# Patient Record
Sex: FEMALE | Race: WHITE | NOT HISPANIC OR LATINO | Employment: FULL TIME | URBAN - METROPOLITAN AREA
[De-identification: names, ages, dates, MRNs, and addresses within clinical notes are randomized per-mention and may not be internally consistent; named-entity substitution may affect disease eponyms.]

---

## 2019-04-16 PROBLEM — E78.2 MIXED HYPERLIPIDEMIA: Status: ACTIVE | Noted: 2019-04-16

## 2019-04-16 PROBLEM — K21.00 GERD WITH ESOPHAGITIS: Status: ACTIVE | Noted: 2019-04-16

## 2019-04-16 PROBLEM — Z79.899 ENCOUNTER FOR LONG-TERM (CURRENT) USE OF MEDICATIONS: Status: ACTIVE | Noted: 2019-04-16

## 2019-04-16 PROBLEM — I10 MODERATE HYPERTENSION: Status: ACTIVE | Noted: 2019-04-16

## 2019-04-16 PROBLEM — F90.0 ATTENTION DEFICIT HYPERACTIVITY DISORDER (ADHD), PREDOMINANTLY INATTENTIVE TYPE: Status: ACTIVE | Noted: 2019-04-16

## 2019-07-02 ENCOUNTER — OFFICE VISIT (OUTPATIENT)
Dept: UROLOGY | Facility: CLINIC | Age: 64
End: 2019-07-02
Payer: COMMERCIAL

## 2019-07-02 ENCOUNTER — TELEPHONE (OUTPATIENT)
Dept: UROLOGY | Facility: CLINIC | Age: 64
End: 2019-07-02

## 2019-07-02 VITALS
HEIGHT: 60 IN | DIASTOLIC BLOOD PRESSURE: 79 MMHG | HEART RATE: 79 BPM | SYSTOLIC BLOOD PRESSURE: 132 MMHG | BODY MASS INDEX: 31.03 KG/M2 | WEIGHT: 158.06 LBS

## 2019-07-02 DIAGNOSIS — N23 RENAL COLIC ON RIGHT SIDE: Primary | ICD-10-CM

## 2019-07-02 DIAGNOSIS — N13.5 URETERAL OBSTRUCTION: ICD-10-CM

## 2019-07-02 PROCEDURE — 99999 PR PBB SHADOW E&M-EST. PATIENT-LVL III: CPT | Mod: PBBFAC,,, | Performed by: UROLOGY

## 2019-07-02 PROCEDURE — 99204 OFFICE O/P NEW MOD 45 MIN: CPT | Mod: S$GLB,,, | Performed by: UROLOGY

## 2019-07-02 PROCEDURE — 99999 PR PBB SHADOW E&M-EST. PATIENT-LVL III: ICD-10-PCS | Mod: PBBFAC,,, | Performed by: UROLOGY

## 2019-07-02 PROCEDURE — 99204 PR OFFICE/OUTPT VISIT, NEW, LEVL IV, 45-59 MIN: ICD-10-PCS | Mod: S$GLB,,, | Performed by: UROLOGY

## 2019-07-02 PROCEDURE — 99213 OFFICE O/P EST LOW 20 MIN: CPT | Mod: PBBFAC,PO | Performed by: UROLOGY

## 2019-07-02 RX ORDER — ESOMEPRAZOLE MAGNESIUM 20 MG/1
20 GRANULE, DELAYED RELEASE ORAL
COMMUNITY
End: 2019-08-06 | Stop reason: HOSPADM

## 2019-07-02 RX ORDER — ONDANSETRON 4 MG/1
TABLET, FILM COATED ORAL
Refills: 9 | COMMUNITY
Start: 2019-06-27 | End: 2021-10-04

## 2019-07-02 RX ORDER — OMEGA-3 FATTY ACIDS 1000 MG
2 CAPSULE ORAL DAILY
COMMUNITY

## 2019-07-02 NOTE — TELEPHONE ENCOUNTER
----- Message from Shaylee Pham sent at 7/2/2019  9:02 AM CDT -----  Contact: self   Type:  Same Day Appointment Request    Caller is requesting a same day appointment.  Caller declined first available appointment listed below.      Name of Caller: patient   When is the first available appointment?  7/17  Symptoms:  Kidney stones   Best Call Back Number:  [phone] (home)   Additional Information:  Pt was seen in the ER last night and need to be seen asap today

## 2019-07-02 NOTE — PROGRESS NOTES
"UROLOGY [place]  7 2 19    Cc R flank pain    Age 64. Started yesterday with pain on R flank and radiation to lower abdomen on that side. Some nausea was present, no emesis. Pt went to an urgent care in [place]. A CT scan was done and told she had a stone in the proximal ureter. Was given some pain medication and it improved.     Report describes "3 x 4 x 6 mm calculus of the right proximal ureter with mild proximal hydronephrosis, hydroureter. Some nonobstructive renal calcifications bilaterally slightly larger on the   left for example measuring 4 x 7 mm at the inferior pole.  There is mild hydronephrosis, hydroureter present on the right corresponding with the   calcification measuring approximately 3 x 4 x 6 mm in length.  The bladder is incompletely fluid-filled for evaluation which may exaggerate the wall thickness.  No radiopaque obstructive calculus or left hydronephrosis is appreciated otherwise.      Pt is experienced in this type of pain from having had kidney stones in the past.     Voiding well. Afebrile.     PMH    Surgical:  has a past surgical history that includes Tubal ligation; Lithotripsy; Breast surgery; Cosmetic surgery; Vaginal delivery; and urital stent.    Medical:  has a past medical history of ADD (attention deficit disorder), Headache(784.0), Hiatal hernia, Hypertension, Kidney stone, Lung nodule, and Pituitary tumor.    Familial: no fh of kidney stones    Social: lives in [place]    Meds:   Current Outpatient Medications on File Prior to Visit   Medication Sig Dispense Refill    atorvastatin (LIPITOR) 20 MG tablet TAKE 1 TABLET(20 MG) 90 tablet 3    bromocriptine (PARLODEL) 5 mg Cap Take 1 capsule (5 mg 90 capsule 3    butalbital-acetaminophen-caff -40 mg -40 mg Cap Cap 1 po as needed for h confusion. 30 capsule 1    cetirizine (ZYRTEC) 10 MG tablet Take       coenzyme Q10 (CO Q-10) 100 mg capsule Take 100 mg by mouth 2 (two) ana      " dextroamphetamine-amphetamine (ADDERALL) 20 mg tablet Tab 1 po in AM.  May take an extra 1/2 tabl 35 tablet 0    dextroamphetamine-amphetamine (ADDERALL) 20 mg tablet Take 1 tablet by mouth 2 (two) ana 60 tablet 0    dextroamphetamine-amphetamine (ADDERALL) 20 mg tablet Take 1 tablet by mouth 2 (two) time 60 tablet 0    fluticasone (FLONASE) 50 mcg/actuation nasal spray 1 spray (50 mcg total) by Each Nare r 16 g 0    ketorolac (TORADOL) 10 mg tablet Take 1 tablet (10 mg to. 16 tablet 0    losartan (COZAAR) 50 MG tablet TAKE 1 TABLET(50 MG)  90 tablet 3    omega-3 fatty acids 1,000 mg Cap Take 2 g by mouth.      ondansetron (ZOFRAN) 4 MG tablet TK 1 T PO BID PRN NV  9    promethazine (PHENERGAN) 25 MG tablet Take 1 tablet (25 mg tot 12 tablet 0    ranitidine (ZANTAC) 75 MG tablet Take 75 mg by mouth 2 (two) times          REVIEW OF SYSTEMS  GENERAL:  No headaches or dizzy spells.   HEENT: vision has glasses. Sinuses: No complaints.   CARDIOPULMONARY: No swelling of the legs; no chest pain. No shortness of breath, no wheezing.   GASTROINTESTINAL: has heartburn. Denies diarrhea; denies constipation, no blood or mucus in stools.   GENITOURINARY: Denies dysuria, bleeding or incontinence.   MUSCULOSKELETAL: No arthritic complaints such as pain or stiffness.   PSYCHIATRIC: No history of depression or anxiety.   ENDOCRINOLOGIC: No reports of sweating, cold or heat intolerance. No polyuria or polydipsia.   NEUROLOGICAL: No dizziness, syncope, paralysis  LYMPHATICS: No enlarged nodes. No history of splenectomy.    Pt alert, oriented, no distress  HEENT: wnl.  Neck: supple, no JVD, no lymphadenopathy  Chest: CV NSR  Lungs: normal chest expansion, no labored breathing  Abdomen flat, nontender, no organomegaly, no masses.  Extremities: no edema, peripheral pulses nl  Neuro: preserved    IMP    7 x 4 mm stone blocking the proximal R ureter. My impression from studying the xray is that the imaging really consists of two  smaller stones together, and if so, we can expect that she would likely pass them on her own. Pt knows to communicate with us if she has persistent pain, as we may need to do ureteroscopic extraction of the stones. If a kub shows the proximal ureteral stones to be distinguishable, we may treat them with eswl.

## 2019-07-03 ENCOUNTER — TELEPHONE (OUTPATIENT)
Dept: UROLOGY | Facility: CLINIC | Age: 64
End: 2019-07-03

## 2019-07-03 ENCOUNTER — HOSPITAL ENCOUNTER (OUTPATIENT)
Dept: RADIOLOGY | Facility: HOSPITAL | Age: 64
Discharge: HOME | End: 2019-07-03
Attending: UROLOGY
Payer: COMMERCIAL

## 2019-07-03 DIAGNOSIS — N20.0 KIDNEY STONE: ICD-10-CM

## 2019-07-03 DIAGNOSIS — N20.0 KIDNEY STONE: Primary | ICD-10-CM

## 2019-07-03 PROCEDURE — 74018 RADEX ABDOMEN 1 VIEW: CPT | Mod: TC,PN

## 2019-07-03 PROCEDURE — 74018 XR ABDOMEN AP 1 VIEW: ICD-10-PCS | Mod: 26,,, | Performed by: RADIOLOGY

## 2019-07-03 PROCEDURE — 74018 RADEX ABDOMEN 1 VIEW: CPT | Mod: 26,,, | Performed by: RADIOLOGY

## 2019-07-03 RX ORDER — HYDROCODONE BITARTRATE AND ACETAMINOPHEN 5; 325 MG/1; MG/1
1 TABLET ORAL EVERY 6 HOURS PRN
Qty: 30 TABLET | Refills: 0 | Status: SHIPPED | OUTPATIENT
Start: 2019-07-03 | End: 2019-07-04 | Stop reason: SDUPTHER

## 2019-07-03 NOTE — TELEPHONE ENCOUNTER
----- Message from Helga Tubbs sent at 7/3/2019 11:27 AM CDT -----  Contact: [phone]  Patient is requesting a call back from the nurse stated the pain medication she's using not working.    Please call the patient upon request at phone number [phone].

## 2019-07-03 NOTE — TELEPHONE ENCOUNTER
Pt states she is having severe break through pain. Scheduled for KUB this afternoon. Toradol is not covering the pain. Can we send something else for pain?

## 2019-07-03 NOTE — TELEPHONE ENCOUNTER
----- Message from Spring Guerrero sent at 7/3/2019 12:03 PM CDT -----  Type:  Patient Returning Call    Who Called:  Patient  Who Left Message for Patient:  Jasson  Does the patient know what this is regarding?:  ELBA  Best Call Back Number:  [phone]  Additional Information:

## 2019-07-05 ENCOUNTER — TELEPHONE (OUTPATIENT)
Dept: UROLOGY | Facility: CLINIC | Age: 64
End: 2019-07-05

## 2019-07-05 DIAGNOSIS — N20.1 URETERAL STONE: Primary | ICD-10-CM

## 2019-07-05 NOTE — TELEPHONE ENCOUNTER
----- Message from Janell Kinney sent at 7/5/2019 11:00 AM CDT -----  Contact: Patient  Type: Needs Medical Advice    Who Called: Patient  Best Call Back Number: [phone] (home)   Additional Information: Patient said she is still waiting on a call back from the nurse in regards to a test that was suppose to be set up please call and she will explain

## 2019-07-05 NOTE — TELEPHONE ENCOUNTER
Spoke to pt and scheduling surgery for Monday at [facility] for her ureteral stone

## 2019-07-08 ENCOUNTER — HOSPITAL ENCOUNTER (OUTPATIENT)
Dept: RADIOLOGY | Facility: HOSPITAL | Age: 64
Discharge: HOME | End: 2019-07-08
Attending: UROLOGY
Payer: COMMERCIAL

## 2019-07-08 ENCOUNTER — TELEPHONE (OUTPATIENT)
Dept: CARDIOLOGY | Facility: CLINIC | Age: 64
End: 2019-07-08

## 2019-07-08 DIAGNOSIS — N20.0 KIDNEY STONE: Primary | ICD-10-CM

## 2019-07-08 DIAGNOSIS — N20.0 KIDNEY STONE: ICD-10-CM

## 2019-07-08 PROCEDURE — 74018 RADEX ABDOMEN 1 VIEW: CPT | Mod: TC,PN

## 2019-07-08 PROCEDURE — 74018 XR ABDOMEN AP 1 VIEW: ICD-10-PCS | Mod: 26,,, | Performed by: RADIOLOGY

## 2019-07-08 PROCEDURE — 74018 RADEX ABDOMEN 1 VIEW: CPT | Mod: 26,,, | Performed by: RADIOLOGY

## 2019-07-08 NOTE — TELEPHONE ENCOUNTER
Pt states she thinks she passed the stone over the weekend. Cancelled procedure and scheduled KUB for today. Advised ot we would call with results. Pt wants to know if she can have another prescription for Toradol sent to her pharmacy. Please advise.

## 2019-07-08 NOTE — TELEPHONE ENCOUNTER
----- Message from RT Kenyon sent at 7/8/2019  8:47 AM CDT -----  Contact: pt    pt , requesting a call back very soon, she is seeking medical advise concerning the stone, thanks.

## 2019-07-10 ENCOUNTER — TELEPHONE (OUTPATIENT)
Dept: UROLOGY | Facility: CLINIC | Age: 64
End: 2019-07-10

## 2019-07-10 NOTE — TELEPHONE ENCOUNTER
----- Message from Jabari Young sent at 7/10/2019  8:42 AM CDT -----  Contact: pt  Type: Needs Medical Advice    Who Called:  pt  Best Call Back Number: [phone]  Additional Information: checking on status of xray done on Monday and refill was supposed to be sent for ketorolac (TORADOL) 10 mg tablet if patient needed more. Please call to advise

## 2019-07-15 ENCOUNTER — TELEPHONE (OUTPATIENT)
Dept: UROLOGY | Facility: CLINIC | Age: 64
End: 2019-07-15

## 2019-07-15 RX ORDER — KETOROLAC TROMETHAMINE 10 MG/1
10 TABLET, FILM COATED ORAL EVERY 6 HOURS
COMMUNITY
End: 2019-08-06 | Stop reason: HOSPADM

## 2019-07-15 NOTE — TELEPHONE ENCOUNTER
----- Message from Ksenia Preciado sent at 7/15/2019  2:06 PM CDT -----  Contact: Patient  Type:  Patient Returning Call    Who Called:  Patient  Who Left Message for Patient:  Dr. Lemos  Does the patient know what this is regarding?:  Yes, test results  Best Call Back Number:  [phone] (home)    Additional Information:  na

## 2019-07-15 NOTE — TELEPHONE ENCOUNTER
Spoke to pt and informed her of test results. Pt is going to go see Dr. Alvarez. I will fax her x-rays and ct per her request. Pt did want a prescription for toradol, called in per dr mitchell.

## 2019-07-15 NOTE — TELEPHONE ENCOUNTER
----- Message from Johnchan Billy sent at 7/15/2019  1:46 PM CDT -----  Contact: Patient  Type: Needs Medical Advice    Who Called:  Patient  Best Call Back Number: [phone]  Additional Information:Requesting an update on x-ray results and medication Toradol. She has attempted to contact office multiple times without a response. Patient is requesting all medical records from [facility] to be faxed to Dr Robb Dooley Fax: [phone] Attn: Isi. Please advise patient of status and when records are sent

## 2019-07-15 NOTE — TELEPHONE ENCOUNTER
I called pt to discuss xrays, I have reviewed them all. That is, the CT scan followed by 3 kub's.     I dont see a definite stone in the R ureter, but the radiologist feels that there may be something there in the plain xray. We know there was a stone when the CT scan was done. Not sure if it is there or not at this time. We see multiple oblong tablets or capsules in the intestinal tract, which can be confused with stones in the kub (but not in a CT scan).     There was no answer and I left a message in the answering machine. If she needs pain medication please supply. If doubt we can do csto, and retrograde and ureteroscopy (works) R side or wait.

## 2019-08-06 ENCOUNTER — OFFICE VISIT (OUTPATIENT)
Dept: CARDIOLOGY | Facility: CLINIC | Age: 64
End: 2019-08-06
Payer: COMMERCIAL

## 2019-08-06 VITALS
WEIGHT: 154.13 LBS | DIASTOLIC BLOOD PRESSURE: 82 MMHG | SYSTOLIC BLOOD PRESSURE: 116 MMHG | HEART RATE: 70 BPM | BODY MASS INDEX: 30.26 KG/M2 | HEIGHT: 60 IN

## 2019-08-06 DIAGNOSIS — E78.2 MIXED HYPERLIPIDEMIA: ICD-10-CM

## 2019-08-06 DIAGNOSIS — I10 MODERATE HYPERTENSION: Primary | ICD-10-CM

## 2019-08-06 PROCEDURE — 99204 OFFICE O/P NEW MOD 45 MIN: CPT | Mod: S$GLB,,, | Performed by: INTERNAL MEDICINE

## 2019-08-06 PROCEDURE — 99999 PR PBB SHADOW E&M-EST. PATIENT-LVL II: ICD-10-PCS | Mod: PBBFAC,,, | Performed by: INTERNAL MEDICINE

## 2019-08-06 PROCEDURE — 99999 PR PBB SHADOW E&M-EST. PATIENT-LVL II: CPT | Mod: PBBFAC,,, | Performed by: INTERNAL MEDICINE

## 2019-08-06 PROCEDURE — 99204 PR OFFICE/OUTPT VISIT, NEW, LEVL IV, 45-59 MIN: ICD-10-PCS | Mod: S$GLB,,, | Performed by: INTERNAL MEDICINE

## 2019-08-06 NOTE — PROGRESS NOTES
Subjective:    Patient ID:  Anna Alas is a 64 y.o. female who presents for evaluation of cardiac issues    HPI  She comes with no atypical chest pain, no SOB  Strong family hx    Review of Systems   Constitution: Negative for decreased appetite, malaise/fatigue, weight gain and weight loss.   Cardiovascular: Negative for chest pain, dyspnea on exertion, leg swelling, palpitations and syncope.   Respiratory: Negative for cough and shortness of breath.    Gastrointestinal: Negative.    Neurological: Negative for weakness.   All other systems reviewed and are negative.       Objective:      Physical Exam   Constitutional: She is oriented to person, place, and time. She appears well-developed and well-nourished.   HENT:   Head: Normocephalic.   Eyes: Pupils are equal, round, and reactive to light.   Neck: Normal range of motion. Neck supple. No JVD present. Carotid bruit is not present. No thyromegaly present.   Cardiovascular: Normal rate, regular rhythm, normal heart sounds, intact distal pulses and normal pulses. PMI is not displaced. Exam reveals no gallop.   No murmur heard.  Pulmonary/Chest: Effort normal and breath sounds normal.   Abdominal: Soft. Normal appearance. She exhibits no mass. There is no hepatosplenomegaly. There is no tenderness.   Musculoskeletal: Normal range of motion. She exhibits no edema.   Neurological: She is alert and oriented to person, place, and time. She has normal strength and normal reflexes. No sensory deficit.   Skin: Skin is warm and intact.   Psychiatric: She has a normal mood and affect.   Nursing note and vitals reviewed.        Assessment:       1. Moderate hypertension    2. Mixed hyperlipidemia         Plan:     Stress echo  Call with results

## 2019-08-14 ENCOUNTER — TELEPHONE (OUTPATIENT)
Dept: CARDIOLOGY | Facility: CLINIC | Age: 64
End: 2019-08-14

## 2019-08-19 ENCOUNTER — TELEPHONE (OUTPATIENT)
Dept: CARDIOLOGY | Facility: CLINIC | Age: 64
End: 2019-08-19

## 2019-08-19 NOTE — TELEPHONE ENCOUNTER
----- Message from Patricia Murray sent at 8/19/2019  9:25 AM CDT -----  Contact: Anna pt  Type: Needs Medical Advice    Who Called:  Anna Fields Call Back Number: [phone]  Additional Information: Pls call pt regarding her stress test

## 2019-09-11 ENCOUNTER — TELEPHONE (OUTPATIENT)
Dept: CARDIOLOGY | Facility: CLINIC | Age: 64
End: 2019-09-11

## 2019-09-11 DIAGNOSIS — I10 MODERATE HYPERTENSION: Primary | ICD-10-CM

## 2019-09-11 DIAGNOSIS — E78.2 MIXED HYPERLIPIDEMIA: ICD-10-CM

## 2019-09-11 NOTE — TELEPHONE ENCOUNTER
Spoke with pt informed her that her stress test was denied because she was out of network. She stated she would like us to submit the test again. Orders have been placed test has been scheduled as requested. Told pt that she would be responsible if her insurance company does not pay. She verbalized understanding.

## 2019-09-11 NOTE — TELEPHONE ENCOUNTER
----- Message from Pablo Loyd sent at 9/11/2019  8:09 AM CDT -----  Contact: Otis FAUST with  [facility]  Type: Needs Medical Advice    Who Called:  Otis    Best Call Back Number: [phone]   Additional Information: States the ppt needs to discuss the ECHO that was on 8/19/19. Otis request the office call the pt.

## 2019-09-11 NOTE — TELEPHONE ENCOUNTER
----- Message from Tiffany Funes sent at 9/11/2019  8:17 AM CDT -----  Contact: self  Patient requesting to speak to nurse regarding she has information to give to office about her insurance and scheduling stress echo test per patient         Please call to advice [phone] (home)

## 2019-09-13 ENCOUNTER — TELEPHONE (OUTPATIENT)
Dept: CARDIOLOGY | Facility: CLINIC | Age: 64
End: 2019-09-13

## 2019-09-13 NOTE — TELEPHONE ENCOUNTER
----- Message from Navi Vela sent at 9/13/2019 11:35 AM CDT -----  Type: Needs Medical Advice    Who Called:  Patient    Best Call Back Number: [phone]  Additional Information: Patient states that she would like a callback regarding whether her Stress Echo test on 10/11 has been approved yet.

## 2019-09-17 ENCOUNTER — TELEPHONE (OUTPATIENT)
Dept: CARDIOLOGY | Facility: CLINIC | Age: 64
End: 2019-09-17

## 2019-09-17 NOTE — TELEPHONE ENCOUNTER
Pt is requesting that we complete a peer to peer for her stress test scheduled for 10/11. Insurance denied due to pt being out of network. The number is provided below:   [phone]

## 2019-09-17 NOTE — TELEPHONE ENCOUNTER
----- Message from Shaylee Pham sent at 9/17/2019 12:39 PM CDT -----  Contact: patient   Patient need to speak with a nurse regarding a stress echo. Please call back at [phone] (home)

## 2019-09-27 NOTE — TELEPHONE ENCOUNTER
I have called twice and left a message to schedule a Jnkx-nk-Foop. Have still not received a call back.

## 2019-10-01 ENCOUNTER — TELEPHONE (OUTPATIENT)
Dept: CARDIOLOGY | Facility: CLINIC | Age: 64
End: 2019-10-01

## 2019-10-01 NOTE — TELEPHONE ENCOUNTER
----- Message from Yady Pimentel sent at 10/1/2019  9:28 AM CDT -----  Type: Needs Medical Advice - message to Kenia    Who Called:  patient  Symptoms (please be specific):  na  How long has patient had these symptoms:  [facility]  Pharmacy name and phone #:  [facility]  Best Call Back Number: [phone]  Additional Information: patient is calling about [facility] [place] [facility] to do a PEER to PEER with patient's physician

## 2019-10-04 ENCOUNTER — TELEPHONE (OUTPATIENT)
Dept: CARDIOLOGY | Facility: CLINIC | Age: 64
End: 2019-10-04

## 2019-10-04 NOTE — TELEPHONE ENCOUNTER
Informed patient that we have not heard back from insurance company. Patient wants to know if there are any other test she can do?

## 2019-10-04 NOTE — TELEPHONE ENCOUNTER
----- Message from Evelyn Sethi sent at 10/4/2019  2:20 PM CDT -----  Contact: patient  Type: Needs Medical Advice    Who Called:  patient  Best Call Back Number: [phone]  Additional Information: requesting a call back regarding approval status for her stress test and if peer to peer was done. please call back to advise, thank you!

## 2019-10-10 ENCOUNTER — TELEPHONE (OUTPATIENT)
Dept: CARDIOLOGY | Facility: CLINIC | Age: 64
End: 2019-10-10

## 2019-10-10 DIAGNOSIS — E78.2 MIXED HYPERLIPIDEMIA: ICD-10-CM

## 2019-10-10 DIAGNOSIS — I10 MODERATE HYPERTENSION: Primary | ICD-10-CM

## 2019-10-10 NOTE — TELEPHONE ENCOUNTER
----- Message from Lina Bobby sent at 10/10/2019  1:19 PM CDT -----  Contact: self  Patient is requesting a call back from Kenia, she thought see was to have a stress echo tomorrow but it is cancelled.  Call back at [phone] (home).  Thanks

## 2019-10-10 NOTE — TELEPHONE ENCOUNTER
----- Message from Lina Bobby sent at 10/10/2019  1:19 PM CDT -----  Contact: self  Patient is requesting a call back from Kenia, she thought see was to have a stress echo tomorrow but it is cancelled.  Call back at [phone] (home).  Thanks

## 2019-10-10 NOTE — TELEPHONE ENCOUNTER
Spoke to patient and scheduled nuclear stress test for 10/15 at 1:15pm. Patient verbalized understanding and is aware of time, date, and instructions.

## 2019-10-21 ENCOUNTER — TELEPHONE (OUTPATIENT)
Dept: CARDIOLOGY | Facility: CLINIC | Age: 64
End: 2019-10-21

## 2019-10-21 PROBLEM — B02.9 HERPES ZOSTER WITHOUT COMPLICATION: Status: ACTIVE | Noted: 2019-10-21

## 2019-10-21 NOTE — TELEPHONE ENCOUNTER
Stress technician already spoke with patient ----- Message from Evelyn Sethi sent at 10/21/2019  2:27 PM CDT -----  Contact: patient  Type: Needs Medical Advice    Who Called:  patient    Best Call Back Number: [phone]    Additional Information: Pt has a nuclear stress test tomorrow and is wondering if her having shingles is an issue and if something can interfere? Please call to advise and answer questions, thank you!

## 2019-10-30 ENCOUNTER — HOSPITAL ENCOUNTER (OUTPATIENT)
Dept: RADIOLOGY | Facility: HOSPITAL | Age: 64
Discharge: HOME | End: 2019-10-30
Attending: INTERNAL MEDICINE
Payer: COMMERCIAL

## 2019-10-30 ENCOUNTER — CLINICAL SUPPORT (OUTPATIENT)
Dept: CARDIOLOGY | Facility: CLINIC | Age: 64
End: 2019-10-30
Attending: INTERNAL MEDICINE
Payer: COMMERCIAL

## 2019-10-30 VITALS — HEIGHT: 60 IN | BODY MASS INDEX: 30.82 KG/M2 | WEIGHT: 157 LBS

## 2019-10-30 DIAGNOSIS — I10 MODERATE HYPERTENSION: ICD-10-CM

## 2019-10-30 DIAGNOSIS — E78.2 MIXED HYPERLIPIDEMIA: ICD-10-CM

## 2019-10-30 LAB
CV STRESS BASE HR: 77 BPM
DIASTOLIC BLOOD PRESSURE: 75 MMHG
NUC REST EJECTION FRACTION: 70
NUC STRESS EJECTION FRACTION: 76 %
OHS CV CPX 1 MINUTE RECOVERY HEART RATE: 106 BPM
OHS CV CPX 85 PERCENT MAX PREDICTED HEART RATE MALE: 129
OHS CV CPX ESTIMATED METS: 12
OHS CV CPX MAX PREDICTED HEART RATE: 151
OHS CV CPX PATIENT IS FEMALE: 1
OHS CV CPX PATIENT IS MALE: 0
OHS CV CPX PEAK DIASTOLIC BLOOD PRESSURE: 82 MMHG
OHS CV CPX PEAK HEAR RATE: 136 BPM
OHS CV CPX PEAK RATE PRESSURE PRODUCT: NORMAL
OHS CV CPX PEAK SYSTOLIC BLOOD PRESSURE: 163 MMHG
OHS CV CPX PERCENT MAX PREDICTED HEART RATE ACHIEVED: 90
OHS CV CPX RATE PRESSURE PRODUCT PRESENTING: NORMAL
STRESS ECHO POST EXERCISE DUR MIN: 6 MINUTES
STRESS ECHO POST EXERCISE DUR SEC: 51 SECONDS
STRESS ECHO TARGET HR: 134 BPM
SYSTOLIC BLOOD PRESSURE: 131 MMHG

## 2019-10-30 PROCEDURE — A9502 TC99M TETROFOSMIN: HCPCS | Mod: PO

## 2019-10-30 PROCEDURE — 99999 PR PBB SHADOW E&M-EST. PATIENT-LVL I: CPT | Mod: PBBFAC,,,

## 2019-10-30 PROCEDURE — 78452 STRESS TEST WITH MYOCARDIAL PERFUSION (CUPID ONLY): ICD-10-PCS | Mod: 26,,, | Performed by: INTERNAL MEDICINE

## 2019-10-30 PROCEDURE — 93015 CV STRESS TEST SUPVJ I&R: CPT | Mod: ,,, | Performed by: INTERNAL MEDICINE

## 2019-10-30 PROCEDURE — 78452 HT MUSCLE IMAGE SPECT MULT: CPT | Mod: 26,,, | Performed by: INTERNAL MEDICINE

## 2019-10-30 PROCEDURE — 99999 PR PBB SHADOW E&M-EST. PATIENT-LVL I: ICD-10-PCS | Mod: PBBFAC,,,

## 2019-10-30 PROCEDURE — 93015 STRESS TEST WITH MYOCARDIAL PERFUSION (CUPID ONLY): ICD-10-PCS | Mod: ,,, | Performed by: INTERNAL MEDICINE

## 2019-10-31 NOTE — PROGRESS NOTES
Advised patient of test result. Patient need to know if she is to start aspirin, and the decision of her Lipitor do she keep it the same dosage or increase the dosage?

## 2020-07-30 ENCOUNTER — OFFICE VISIT (OUTPATIENT)
Dept: CARDIOLOGY | Facility: CLINIC | Age: 65
End: 2020-07-30
Payer: COMMERCIAL

## 2020-07-30 VITALS
BODY MASS INDEX: 31.38 KG/M2 | HEART RATE: 84 BPM | DIASTOLIC BLOOD PRESSURE: 91 MMHG | WEIGHT: 159.81 LBS | HEIGHT: 60 IN | SYSTOLIC BLOOD PRESSURE: 154 MMHG

## 2020-07-30 DIAGNOSIS — I10 MODERATE HYPERTENSION: Primary | ICD-10-CM

## 2020-07-30 DIAGNOSIS — E78.2 MIXED HYPERLIPIDEMIA: ICD-10-CM

## 2020-07-30 PROCEDURE — 99999 PR PBB SHADOW E&M-EST. PATIENT-LVL III: ICD-10-PCS | Mod: PBBFAC,,, | Performed by: INTERNAL MEDICINE

## 2020-07-30 PROCEDURE — 99214 OFFICE O/P EST MOD 30 MIN: CPT | Mod: S$GLB,,, | Performed by: INTERNAL MEDICINE

## 2020-07-30 PROCEDURE — 99214 PR OFFICE/OUTPT VISIT, EST, LEVL IV, 30-39 MIN: ICD-10-PCS | Mod: S$GLB,,, | Performed by: INTERNAL MEDICINE

## 2020-07-30 PROCEDURE — 99999 PR PBB SHADOW E&M-EST. PATIENT-LVL III: CPT | Mod: PBBFAC,,, | Performed by: INTERNAL MEDICINE

## 2020-07-30 RX ORDER — PRAVASTATIN SODIUM 40 MG/1
40 TABLET ORAL NIGHTLY
Qty: 90 TABLET | Refills: 3 | Status: SHIPPED | OUTPATIENT
Start: 2020-07-30 | End: 2021-11-30

## 2020-07-30 NOTE — PROGRESS NOTES
Subjective:    Patient ID:  Anna Alas is a 65 y.o. female who presents for follow-up of hypertension    HPI  She comes with no major complaints, no chest pain, no shortness of breath  Muscle pains are main complaint    Review of Systems   Constitution: Negative for decreased appetite, malaise/fatigue, weight gain and weight loss.   Cardiovascular: Negative for chest pain, dyspnea on exertion, leg swelling, palpitations and syncope.   Respiratory: Negative for cough and shortness of breath.    Gastrointestinal: Negative.    Neurological: Negative for weakness.   All other systems reviewed and are negative.       Objective:      Physical Exam   Constitutional: She is oriented to person, place, and time. She appears well-developed and well-nourished.   HENT:   Head: Normocephalic.   Eyes: Pupils are equal, round, and reactive to light.   Neck: Normal range of motion. Neck supple. No JVD present. Carotid bruit is not present. No thyromegaly present.   Cardiovascular: Normal rate, regular rhythm, normal heart sounds, intact distal pulses and normal pulses. PMI is not displaced. Exam reveals no gallop.   No murmur heard.  Pulmonary/Chest: Effort normal and breath sounds normal.   Abdominal: Soft. Normal appearance. She exhibits no mass. There is no hepatosplenomegaly. There is no abdominal tenderness.   Musculoskeletal: Normal range of motion.         General: No edema.   Neurological: She is alert and oriented to person, place, and time. She has normal strength and normal reflexes. No sensory deficit.   Skin: Skin is warm and intact.   Psychiatric: She has a normal mood and affect.   Nursing note and vitals reviewed.        Assessment:       1. Moderate hypertension    2. Mixed hyperlipidemia         Plan:   Labs next week; call with results  Stop lipitor  Try pravastatin 40 qhs  Continue all other cardiac medications  Regular exercise program  Weight loss  9 m f/u

## 2020-09-17 ENCOUNTER — OFFICE VISIT (OUTPATIENT)
Dept: GASTROENTEROLOGY | Facility: CLINIC | Age: 65
End: 2020-09-17
Payer: COMMERCIAL

## 2020-09-17 VITALS
WEIGHT: 160.06 LBS | BODY MASS INDEX: 31.42 KG/M2 | DIASTOLIC BLOOD PRESSURE: 88 MMHG | HEART RATE: 78 BPM | HEIGHT: 60 IN | SYSTOLIC BLOOD PRESSURE: 143 MMHG | RESPIRATION RATE: 18 BRPM

## 2020-09-17 DIAGNOSIS — K21.9 GASTROESOPHAGEAL REFLUX DISEASE, ESOPHAGITIS PRESENCE NOT SPECIFIED: Primary | ICD-10-CM

## 2020-09-17 DIAGNOSIS — K44.9 HIATAL HERNIA: ICD-10-CM

## 2020-09-17 DIAGNOSIS — R07.89 ATYPICAL CHEST PAIN: ICD-10-CM

## 2020-09-17 DIAGNOSIS — Z01.812 PRE-PROCEDURE LAB EXAM: ICD-10-CM

## 2020-09-17 PROCEDURE — 99999 PR PBB SHADOW E&M-EST. PATIENT-LVL IV: CPT | Mod: PBBFAC,,, | Performed by: INTERNAL MEDICINE

## 2020-09-17 PROCEDURE — 99203 OFFICE O/P NEW LOW 30 MIN: CPT | Mod: S$GLB,,, | Performed by: INTERNAL MEDICINE

## 2020-09-17 PROCEDURE — 99203 PR OFFICE/OUTPT VISIT, NEW, LEVL III, 30-44 MIN: ICD-10-PCS | Mod: S$GLB,,, | Performed by: INTERNAL MEDICINE

## 2020-09-17 PROCEDURE — 99999 PR PBB SHADOW E&M-EST. PATIENT-LVL IV: ICD-10-PCS | Mod: PBBFAC,,, | Performed by: INTERNAL MEDICINE

## 2020-09-17 NOTE — PROGRESS NOTES
Subjective:       Patient ID: Anna Alas is a 65 y.o. female.    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  She was seen in the ER a few weeks ago (see below).   She's been having more burning recently (about 2-3 x/week).  And rarely has trouble swallowing.  She's been told she might have a hiatal hernia (told that in the last 5 years).  Doesn't recall how it was dx'd, as she's never had an EGD (see CT report below).  She had previously taken Protonix OTC, but now on pantoprazole 40 mg since ER, as well as sucralfate since ER.  And she's taken Pepcid PRN.    No signig GI disease in the family.  Both parents with lung cancer (both were smokers).  And heart disease,too.  Her twin sister and a brother have CAD, and have stents.      Seen in ER 8/22/2020:  Patient presents with   Nausea   Heartburn   This is a 65-year-old female who has a history of hypertension, high triglycerides, strong cardiac family history including her twin sister had a heart attack, who is had a stress test in October of last year, nuclear stress test which was normal.  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  She has very mild nausea, pain does not radiate, no associated shortness of breath.  She has had no fever chills cough or cold symptoms, no leg pain or swelling.  ED Course as of Aug 22 1131  Sat Aug 22, 2020  1022 Patient has  "been asymptomatic in the emergency department, this does appear to be GI in nature, she had a negative stress test 1 year ago, it is burning, associated nausea, she is on an unknown acid blocker, will add Carafate and have her follow-up with GI for further workup.       CT Scan 7/2/2020:   "Small hiatal hernia with slight gastroesophageal fold thickening is appreciated. "    Review of Systems   Constitutional: Negative for activity change, appetite change, fatigue, fever and unexpected weight change.   HENT: Positive for trouble swallowing. Negative for dental problem, drooling, mouth sores, sore throat and voice change.    Eyes: Negative.    Respiratory: Negative for cough, choking, shortness of breath, wheezing and stridor.    Cardiovascular: Positive for chest pain (Burning chest pain.).   Gastrointestinal: Positive for abdominal pain (Occ epig pain.). Negative for abdominal distention, anal bleeding, blood in stool, constipation, diarrhea, nausea, rectal pain and vomiting.   Genitourinary: Negative.    Musculoskeletal: Negative for arthralgias, joint swelling, myalgias and neck stiffness.   Integumentary:  Negative for color change and rash.   Neurological: Negative for weakness.   Hematological: Negative for adenopathy. Does not bruise/bleed easily.   Psychiatric/Behavioral: Negative for agitation, behavioral problems, confusion, decreased concentration and dysphoric mood.         Objective:      Physical Exam  Vitals signs and nursing note reviewed.   Constitutional:       General: She is not in acute distress.     Appearance: Normal appearance. She is well-developed.   HENT:      Head: Normocephalic and atraumatic.      Nose: Nose normal.      Mouth/Throat:      Mouth: Mucous membranes are moist.      Pharynx: No oropharyngeal exudate.   Eyes:      General: No scleral icterus.     Conjunctiva/sclera: Conjunctivae normal.   Neck:      Musculoskeletal: Neck supple.      Thyroid: No thyromegaly.      Trachea: No " tracheal deviation.   Cardiovascular:      Rate and Rhythm: Normal rate and regular rhythm.      Heart sounds: Normal heart sounds. No murmur. No gallop.    Pulmonary:      Effort: Pulmonary effort is normal.      Breath sounds: Normal breath sounds. No wheezing or rales.   Abdominal:      General: Bowel sounds are normal. There is no distension.      Palpations: Abdomen is soft. There is no mass.      Tenderness: There is abdominal tenderness (Slight epig tenderness.). There is rebound. There is no guarding.   Musculoskeletal: Normal range of motion.   Lymphadenopathy:      Cervical: No cervical adenopathy.   Skin:     General: Skin is warm and dry.      Findings: No erythema or rash.   Neurological:      Mental Status: She is alert and oriented to person, place, and time.   Psychiatric:         Mood and Affect: Mood normal.         Behavior: Behavior normal.         Thought Content: Thought content normal.         Assessment:         Gastroesophageal reflux disease, esophagitis presence not specified    Atypical chest pain    Hiatal hernia       Plan:        1. Cont meds for now.   2. Sched for EGD.

## 2020-09-19 ENCOUNTER — LAB VISIT (OUTPATIENT)
Dept: FAMILY MEDICINE | Facility: CLINIC | Age: 65
End: 2020-09-19
Payer: COMMERCIAL

## 2020-09-19 DIAGNOSIS — Z01.812 PRE-PROCEDURE LAB EXAM: ICD-10-CM

## 2020-09-19 PROCEDURE — U0003 INFECTIOUS AGENT DETECTION BY NUCLEIC ACID (DNA OR RNA); SEVERE ACUTE RESPIRATORY SYNDROME CORONAVIRUS 2 (SARS-COV-2) (CORONAVIRUS DISEASE [COVID-19]), AMPLIFIED PROBE TECHNIQUE, MAKING USE OF HIGH THROUGHPUT TECHNOLOGIES AS DESCRIBED BY CMS-2020-01-R: HCPCS

## 2020-09-20 LAB — SARS-COV-2 RNA RESP QL NAA+PROBE: NOT DETECTED

## 2020-09-22 ENCOUNTER — HOSPITAL ENCOUNTER (OUTPATIENT)
Facility: HOSPITAL | Age: 65
Discharge: HOME | End: 2020-09-22
Attending: INTERNAL MEDICINE | Admitting: INTERNAL MEDICINE
Payer: COMMERCIAL

## 2020-09-22 ENCOUNTER — ANESTHESIA (OUTPATIENT)
Dept: ENDOSCOPY | Facility: HOSPITAL | Age: 65
End: 2020-09-22
Payer: COMMERCIAL

## 2020-09-22 ENCOUNTER — ANESTHESIA EVENT (OUTPATIENT)
Dept: ENDOSCOPY | Facility: HOSPITAL | Age: 65
End: 2020-09-22
Payer: COMMERCIAL

## 2020-09-22 DIAGNOSIS — K21.9 GERD (GASTROESOPHAGEAL REFLUX DISEASE): ICD-10-CM

## 2020-09-22 DIAGNOSIS — K21.00 GERD WITH ESOPHAGITIS: Primary | ICD-10-CM

## 2020-09-22 LAB — H PYLORI INDEX VALUE: NEGATIVE

## 2020-09-22 PROCEDURE — D9220A PRA ANESTHESIA: Mod: CRNA,,, | Performed by: NURSE ANESTHETIST, CERTIFIED REGISTERED

## 2020-09-22 PROCEDURE — 43248 PR EGD, FLEX, W/DILATION OVER GUIDEWIRE: ICD-10-PCS | Mod: ,,, | Performed by: INTERNAL MEDICINE

## 2020-09-22 PROCEDURE — 25000003 PHARM REV CODE 250: Mod: PO | Performed by: NURSE ANESTHETIST, CERTIFIED REGISTERED

## 2020-09-22 PROCEDURE — 88342 CHG IMMUNOCYTOCHEMISTRY: ICD-10-PCS | Mod: 26,,, | Performed by: PATHOLOGY

## 2020-09-22 PROCEDURE — 43239 EGD BIOPSY SINGLE/MULTIPLE: CPT | Mod: 59,,, | Performed by: INTERNAL MEDICINE

## 2020-09-22 PROCEDURE — 43239 EGD BIOPSY SINGLE/MULTIPLE: CPT | Mod: PO | Performed by: INTERNAL MEDICINE

## 2020-09-22 PROCEDURE — 63600175 PHARM REV CODE 636 W HCPCS: Mod: PO | Performed by: INTERNAL MEDICINE

## 2020-09-22 PROCEDURE — 27201012 HC FORCEPS, HOT/COLD, DISP: Mod: PO | Performed by: INTERNAL MEDICINE

## 2020-09-22 PROCEDURE — 87449 NOS EACH ORGANISM AG IA: CPT | Mod: PO | Performed by: INTERNAL MEDICINE

## 2020-09-22 PROCEDURE — 88342 IMHCHEM/IMCYTCHM 1ST ANTB: CPT | Performed by: PATHOLOGY

## 2020-09-22 PROCEDURE — D9220A PRA ANESTHESIA: Mod: ANES,,, | Performed by: ANESTHESIOLOGY

## 2020-09-22 PROCEDURE — 37000009 HC ANESTHESIA EA ADD 15 MINS: Mod: PO | Performed by: INTERNAL MEDICINE

## 2020-09-22 PROCEDURE — 37000008 HC ANESTHESIA 1ST 15 MINUTES: Mod: PO | Performed by: INTERNAL MEDICINE

## 2020-09-22 PROCEDURE — 88305 TISSUE EXAM BY PATHOLOGIST: ICD-10-PCS | Mod: 26,,, | Performed by: PATHOLOGY

## 2020-09-22 PROCEDURE — 63600175 PHARM REV CODE 636 W HCPCS: Mod: PO | Performed by: NURSE ANESTHETIST, CERTIFIED REGISTERED

## 2020-09-22 PROCEDURE — 43248 EGD GUIDE WIRE INSERTION: CPT | Mod: ,,, | Performed by: INTERNAL MEDICINE

## 2020-09-22 PROCEDURE — D9220A PRA ANESTHESIA: ICD-10-PCS | Mod: CRNA,,, | Performed by: NURSE ANESTHETIST, CERTIFIED REGISTERED

## 2020-09-22 PROCEDURE — 43248 EGD GUIDE WIRE INSERTION: CPT | Mod: PO | Performed by: INTERNAL MEDICINE

## 2020-09-22 PROCEDURE — D9220A PRA ANESTHESIA: ICD-10-PCS | Mod: ANES,,, | Performed by: ANESTHESIOLOGY

## 2020-09-22 PROCEDURE — 88342 IMHCHEM/IMCYTCHM 1ST ANTB: CPT | Mod: 26,,, | Performed by: PATHOLOGY

## 2020-09-22 PROCEDURE — 43239 PR EGD, FLEX, W/BIOPSY, SGL/MULTI: ICD-10-PCS | Mod: 59,,, | Performed by: INTERNAL MEDICINE

## 2020-09-22 PROCEDURE — 88305 TISSUE EXAM BY PATHOLOGIST: CPT | Mod: 26,,, | Performed by: PATHOLOGY

## 2020-09-22 PROCEDURE — 88305 TISSUE EXAM BY PATHOLOGIST: CPT | Mod: 59 | Performed by: PATHOLOGY

## 2020-09-22 RX ORDER — PROPOFOL 10 MG/ML
VIAL (ML) INTRAVENOUS
Status: DISCONTINUED | OUTPATIENT
Start: 2020-09-22 | End: 2020-09-22

## 2020-09-22 RX ORDER — SODIUM CHLORIDE 0.9 % (FLUSH) 0.9 %
10 SYRINGE (ML) INJECTION
Status: DISCONTINUED | OUTPATIENT
Start: 2020-09-22 | End: 2020-09-22 | Stop reason: HOSPADM

## 2020-09-22 RX ORDER — SODIUM CHLORIDE, SODIUM LACTATE, POTASSIUM CHLORIDE, CALCIUM CHLORIDE 600; 310; 30; 20 MG/100ML; MG/100ML; MG/100ML; MG/100ML
INJECTION, SOLUTION INTRAVENOUS CONTINUOUS
Status: DISCONTINUED | OUTPATIENT
Start: 2020-09-22 | End: 2020-09-22 | Stop reason: HOSPADM

## 2020-09-22 RX ORDER — FAMOTIDINE 40 MG/1
40 TABLET, FILM COATED ORAL NIGHTLY
Qty: 60 TABLET | Refills: 5 | Status: SHIPPED | OUTPATIENT
Start: 2020-09-22 | End: 2021-07-15 | Stop reason: ALTCHOICE

## 2020-09-22 RX ORDER — LIDOCAINE HCL/PF 100 MG/5ML
SYRINGE (ML) INTRAVENOUS
Status: DISCONTINUED | OUTPATIENT
Start: 2020-09-22 | End: 2020-09-22

## 2020-09-22 RX ADMIN — PROPOFOL 30 MG: 10 INJECTION, EMULSION INTRAVENOUS at 01:09

## 2020-09-22 RX ADMIN — LIDOCAINE HYDROCHLORIDE 100 MG: 20 INJECTION, SOLUTION INTRAVENOUS at 01:09

## 2020-09-22 RX ADMIN — SODIUM CHLORIDE, SODIUM LACTATE, POTASSIUM CHLORIDE, AND CALCIUM CHLORIDE: .6; .31; .03; .02 INJECTION, SOLUTION INTRAVENOUS at 12:09

## 2020-09-22 NOTE — [employer] PATIENT INSTRUCTIONS
Discharge Summary/Instructions after an Endoscopic Procedure  Patient Name: Anna Alas  Patient MRN: [id number]  Patient YOB: 1955 Tuesday, September 22, 2020  Pillo Lopez MD  RESTRICTIONS:  During your procedure today, you received medications for sedation.  These   medications may affect your judgment, balance and coordination.  Therefore,   for 24 hours, you have the following restrictions:   - DO NOT drive a car, operate machinery, make legal/financial decisions,   sign important papers or drink alcohol.    ACTIVITY:  Today: no heavy lifting, straining or running due to procedural   sedation/anesthesia.  The following day: return to full activity including work.  DIET:  Eat and drink normally unless instructed otherwise.     TREATMENT FOR COMMON SIDE EFFECTS:  - Mild abdominal pain, nausea, belching, bloating or excessive gas:  rest,   eat lightly and use a heating pad.  - Sore Throat: treat with throat lozenges and/or gargle with warm salt   water.  - Because air was used during the procedure, expelling large amounts of air   from your rectum or belching is normal.  - If a bowel prep was taken, you may not have a bowel movement for 1-3 days.    This is normal.  SYMPTOMS TO WATCH FOR AND REPORT TO YOUR PHYSICIAN:  1. Abdominal pain or bloating, other than gas cramps.  2. Chest pain.  3. Back pain.  4. Signs of infection such as: chills or fever occurring within 24 hours   after the procedure.  5. Rectal bleeding, which would show as bright red, maroon, or black stools.   (A tablespoon of blood from the rectum is not serious, especially if   hemorrhoids are present.)  6. Vomiting.  7. Weakness or dizziness.  GO DIRECTLY TO THE NEAREST EMERGENCY ROOM IF YOU HAVE ANY OF THE FOLLOWING:      Difficulty breathing              Chills and/or fever over 101 F   Persistent vomiting and/or vomiting blood   Severe abdominal pain   Severe chest pain   Black, tarry stools   Bleeding- more than one  tablespoon   Any other symptom or condition that you feel may need urgent attention  Your doctor recommends these additional instructions:  If any biopsies were taken, your doctors clinic will contact you in 1 to 2   weeks with any results.  Continue your present medications.   Take Protonix (pantoprazole) 40 mg by mouth once a day.   Take Carafate (sucralfate) tablets 1 gram by mouth four times a day.   Take Pepcid (famotidine) 40 mg by mouth every night.   Your physician has recommended a gastric emptying study at appointment to be   scheduled.   Return to your GI clinic in 6-8 weeks.  For questions, problems or results please call your physician - Pillo Lopez MD at Work:  [phone].  EMERGENCY PHONE NUMBER: [phone], LAB RESULTS: [phone]  IF A COMPLICATION OR EMERGENCY SITUATION ARISES AND YOU ARE UNABLE TO REACH   YOUR PHYSICIAN - GO DIRECTLY TO THE EMERGENCY ROOM.  ___________________________________________  Nurse Signature  ___________________________________________  Patient/Designated Responsible Party Signature  Pillo Lopez MD  9/22/2020 2:16:22 PM  This report has been verified and signed electronically.  [employer]

## 2020-09-22 NOTE — DISCHARGE INSTRUCTIONS
Recovery After Procedural Sedation (Adult)   You have been given medicine by vein to make you sleep during your surgery. This may have included both a pain medicine and sleeping medicine. Most of the effects have worn off. But you may still have some drowsiness for the next 6 to 8 hours.  Home care  Follow these guidelines when you get home:  · For the next 8 hours, you should be watched by a responsible adult. This person should make sure your condition is not getting worse.  · Don't drink any alcohol for the next 24 hours.  · Don't drive, operate dangerous machinery, or make important business or personal decisions during the next 24 hours.  · To prevent injury or falls, use caution when standing and walking for at least 24 hours after your procedure.  Note: Your healthcare provider may tell you not to take any medicine by mouth for pain or sleep in the next 4 hours. These medicines may react with the medicines you were given in the hospital. This could cause a much stronger response than usual.  Follow-up care  Follow up with your healthcare provider if you are not alert and back to your usual level of activity within 12 hours.  When to seek medical advice  Call your healthcare provider right away if any of these occur:  · Drowsiness gets worse  · Weakness or dizziness gets worse  · Repeated vomiting  · You can't be awakened  · Fever  · New rash  [employer] last reviewed this educational content on 9/1/2019  © 8852-0710 [employer], [employer]. [address] [place] [address]. All rights reserved. This information is not intended as a substitute for professional medical care. Always follow your healthcare professional's instructions.        Tips to Control Acid Reflux    To control acid reflux, youll need to make some basic diet and lifestyle changes. The simple steps outlined below may be all youll need to ease discomfort.  Watch what you eat  · Avoid fatty foods and spicy foods.  · Eat fewer  acidic foods, such as citrus and tomato-based foods. These can increase symptoms.  · Limit drinking alcohol, caffeine, and fizzy beverages. All increase acid reflux.  · Try limiting chocolate, peppermint, and spearmint. These can worsen acid reflux in some people.  Watch when you eat  · Avoid lying down for 3 hours after eating.  · Do not snack before going to bed.  Raise your head  Raising your head and upper body by 4 to 6 inches helps limit reflux when youre lying down. Put blocks under the head of your bed frame to raise it.  Other changes  · Lose weight, if you need to  · Dont exercise near bedtime  · Avoid tight-fitting clothes  · Limit aspirin and ibuprofen  · Stop smoking   Date Last Reviewed: 7/1/2016 © 2000-2017 [employer]. [address] [place] [address], [place] [address]. All rights reserved. This information is not intended as a substitute for professional medical care. Always follow your healthcare professional's instructions.        Diabetic Gastroparesis  Gastroparesis, or delayed gastric emptying, is when food moves through the stomach more slowly than normal. In someone with diabetes, its caused by damage to the vagus nerve because of chronic high blood sugar. (Other chronic diseases may also cause gastroparesis.) The vagus nerve helps control how food moves through the digestive system. When this nerve is damaged, the movement of food is slowed down or stopped.  Food that stays in the stomach for too long can cause problems. Food can ferment in the stomach, causing bacteria to grow. Undigested food can also harden into masses called bezoars. These can cause nausea and vomiting. In some cases they may block food from passing from the stomach to the small intestine.  Gastroparesis can make it hard to manage blood sugar levels. It can also cause problems with vitamins and minerals being absorbed into the body as well as maintaining a healthy weight.    Symptoms of diabetic gastroparesis  include:  · Nausea  · Vomiting  · Feeling full after eating a small amount of food  · Abdominal pain or cramps  · Heartburn  · Abdominal bloating  · Weight loss  · Loss of appetite  · High or low blood sugar levels  There are several different tests and studies that can help diagnose gastroparesis.  For many people, gastroparesis is a lifelong condition. Managing it will likely include changes in how you eat. You may be prescribed medicine to help with blood sugar levels, help your symptoms like nausea and vomiting, or act on muscles in the digestive system. In severe cases, surgery to insert a feeding tube may be needed. Or a special device may be implanted to encourage the stomach muscles to contract.  Home care  These changes may help relieve your symptoms:  · Take prescribed medicines exactly as directed.  · Eat a liquid or soft diet if advised.  · Eat frequent small meals instead of less frequent large meals.  · Avoid foods that are high in fat (such as whole milk, cheese, and fried foods) or fiber (such as beans, and many fruits and vegetables). These can slow digestion.  · People with diabetes should always control sugar levels as well as possible as directed by your healthcare provider.  Follow-up care  Follow up with your healthcare provider as advised. Regular visits may be needed to manage gastroparesis.  When to seek medical advice  Call your healthcare provider right away if any of these occur.  · Severe pain in your abdomen  · Inability to keep down food or liquids  · Weight loss  · Other symptoms as directed by your healthcare provider  Date Last Reviewed: 12/27/2015  © 5135-4880 [employer]. [address] [place] [address], [place] [address]. All rights reserved. This information is not intended as a substitute for professional medical care. Always follow your healthcare professional's instructions.

## 2020-09-22 NOTE — TRANSFER OF CARE
Anesthesia Transfer of Care Note    Patient: Anna Alas    Procedure(s) Performed: Procedure(s) (LRB):  EGD (ESOPHAGOGASTRODUODENOSCOPY) (N/A)    Patient location: PACU    Anesthesia Type: general    Transport from OR: Transported from OR on room air with adequate spontaneous ventilation    Post pain: adequate analgesia    Post assessment: no apparent anesthetic complications and tolerated procedure well    Post vital signs: stable    Level of consciousness: awake and alert    Nausea/Vomiting: no nausea/vomiting    Complications: none    Transfer of care protocol was followed      Last vitals:   Visit Vitals  /66 (BP Location: Left arm, Patient Position: Lying)   Pulse 66   Temp 36.6 °C (97.8 °F) (Skin)   Resp 16   Ht 5' (1.524 m)   Wt 72.1 kg (159 lb)   LMP 07/04/2012   SpO2 96%   Breastfeeding No   BMI 31.05 kg/m²

## 2020-09-22 NOTE — ANESTHESIA PREPROCEDURE EVALUATION
09/22/2020  Anna Alas is a 65 y.o., female.    Anesthesia Evaluation    I have reviewed the Patient Summary Reports.    I have reviewed the Nursing Notes.       Review of Systems  Anesthesia Hx:  No problems with previous Anesthesia    Cardiovascular:   Hypertension    Renal/:   Chronic Renal Disease    Hepatic/GI:   Hiatal Hernia, GERD    Neurological:   Headaches    Psych:   Psychiatric History          Physical Exam  General:  Well nourished    Airway/Jaw/Neck:  Airway Findings: Mouth Opening: Normal Tongue: Normal  Mallampati: II  TM Distance: Normal, at least 6 cm  Jaw/Neck Findings:  Neck ROM: Normal ROM       Chest/Lungs:  Chest/Lungs Findings: Clear to auscultation, Normal Respiratory Rate     Heart/Vascular:  Heart Findings: Rate: Normal  Rhythm: Regular Rhythm        Mental Status:  Mental Status Findings:  Cooperative, Alert and Oriented         Anesthesia Plan  Type of Anesthesia, risks & benefits discussed:  Anesthesia Type:  general  Patient's Preference: General  Intra-op Monitoring Plan: standard ASA monitors  Intra-op Monitoring Plan Comments:   Post Op Pain Control Plan:   Post Op Pain Control Plan Comments:   Induction:   IV  Beta Blocker:  Patient is not currently on a Beta-Blocker (No further documentation required).       Informed Consent: Patient understands risks and agrees with Anesthesia plan.  Questions answered. Anesthesia consent signed with patient.  ASA Score: 2     Day of Surgery Review of History & Physical:    H&P update referred to the surgeon.         Ready For Surgery From Anesthesia Perspective.

## 2020-09-22 NOTE — H&P
History & Physical - Short Stay  Gastroenterology      SUBJECTIVE:     Procedure: Gastroscopy    Chief Complaint/Indication for Procedure: GERD    History of Present Illness:  Office Visit  Open     9/17/2020  [facility] - Gastroenterology     Pillo Lopez Jr., MD  Gastroenterology  Gastroesophageal reflux disease, esophagitis presence not specified +2 more  Dx  Gastroesophageal Reflux ; Referred by Aaareferral Self  Reason for Visit       Progress Notes  Pillo Lopez Jr., MD (Physician)   Gastroenterology   9/17/2020  2:00 PM  Unsigned     Subjective:       Patient ID: Anna Alas is a 65 y.o. female.     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  She was seen in the ER a few weeks ago (see below).        Seen in ER 8/22/2020:  Patient presents with             Nausea             Heartburn   This is a 65-year-old female who has a history of hypertension, high triglycerides, strong cardiac family history including her twin sister had a heart attack, who is had a stress test in October of last year, nuclear stress test which was normal.  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  She has very mild nausea, pain does not radiate, no associated shortness of breath.  She has had no fever chills cough or cold symptoms, no leg pain or swelling.  ED Course as of Aug 22 1131  Sat Aug 22, 2020  1022     "Patient has been asymptomatic in the emergency department, this does appear to be GI in nature, she had a negative stress test 1 year ago, it is burning, associated nausea, she is on an unknown acid blocker, will add Carafate and have her follow-up with GI for further workup.         CT Scan 7/2/2020:   "Small hiatal hernia with slight gastroesophageal fold thickening is appreciated. "     Assessment:         Gastroesophageal reflux disease, esophagitis presence not specified     Atypical chest pain     Hiatal hernia        Plan:        1. Cont meds for now.   2. Sched for EGD.               PTA Medications   Medication Sig    bromocriptine (PARLODEL) 5 mg Cap Cap 1 po at HS.    butalbital-acetaminophen-caff -40 mg -40 mg Cap Cap 1 po as needed for headache.  May take every 8 hours if needed.  Can cause drowsiness or confusion. (Patient taking differently: Take 1 capsule by mouth 3 (three) times daily as needed. Cap 1 po as needed for headache.  May take every 8 hours if needed.  Can cause drowsiness or confusion.)    cetirizine (ZYRTEC) 10 MG tablet Take 10 mg by mouth daily as needed. 1 Tablet Oral Every day.  as directed    cholecalciferol, vitamin D3, (VITAMIN D3) 2,000 unit Cap Take 1 capsule by mouth once daily.    coenzyme Q10 (CO Q-10) 100 mg capsule Take 100 mg by mouth 2 (two) times daily.    dextroamphetamine-amphetamine (ADDERALL) 20 mg tablet Take 1 tablet by mouth 2 (two) times daily.    dextroamphetamine-amphetamine (ADDERALL) 20 mg tablet Take 1 tablet by mouth 2 (two) times daily.    losartan (COZAAR) 50 MG tablet TAKE 1 TABLET(50 MG) BY MOUTH EVERY DAY    omega-3 fatty acids 1,000 mg Cap Take 2 g by mouth.    pantoprazole (PROTONIX) 40 MG tablet Take 1 tablet (40 mg total) by mouth once daily.    pravastatin (PRAVACHOL) 40 MG tablet Take 1 tablet (40 mg total) by mouth every evening.    sucralfate (CARAFATE) 1 gram tablet Take 1 tablet (1 g total) by mouth 4 (four) times " daily. dissolve in 10 mL of water and swallow    acyclovir 5% (ZOVIRAX) 5 % Crea Apply topically 5 (five) times daily. Shingles compound Rx ALK. (Patient not taking: Reported on 9/21/2020)    atorvastatin (LIPITOR) 20 MG tablet TAKE 1 TABLET(20 MG) BY MOUTH EVERY NIGHT (Patient not taking: Reported on 9/21/2020)    capsicum 0.075% (ZOSTRIX) 0.075 % topical cream Apply topically 3 (three) times daily.    dextroamphetamine-amphetamine (ADDERALL) 20 mg tablet Take 1 tablet by mouth 2 (two) times daily.    guaifenesin-codeine 100-10 mg/5 ml (TUSSI-ORGANIDIN NR)  mg/5 mL syrup Take 5 mLs by mouth every 6 (six) hours as needed. (Patient not taking: Reported on 9/17/2020)    hydrocodone-chlorpheniramine (TUSSIONEX PENNKINETIC ER) 10-8 mg/5 mL suspension Take 5 mLs by mouth nightly as needed. (Patient not taking: Reported on 9/17/2020)    ketorolac (TORADOL) 10 mg tablet Take 1 tablet (10 mg total) by mouth every 6 (six) hours. (Patient not taking: Reported on 9/17/2020)    methylPREDNISolone (MEDROL DOSEPACK) 4 mg tablet use as directed (Patient not taking: Reported on 9/21/2020)    ondansetron (ZOFRAN) 4 MG tablet TK 1 T PO BID PRN NV    promethazine (PHENERGAN) 25 MG tablet Take 1 tablet (25 mg total) by mouth every 6 (six) hours as needed for Nausea.    ranitidine (ZANTAC) 75 MG tablet Take 75 mg by mouth 2 (two) times daily as needed.     valACYclovir (VALTREX) 1000 MG tablet Take 1 tablet (1,000 mg total) by mouth 3 (three) times daily. for 7 days (Patient not taking: Reported on 9/17/2020)       Review of patient's allergies indicates:   Allergen Reactions    Benadryl [diphenhydramine hcl]      Other reaction(s): Itching, pt can use up to 25mg without itching    Lisinopril      Cough    Shellfish containing products Hives     Just Shrimp (food)        Past Medical History:   Diagnosis Date    ADD (attention deficit disorder)     Headache(784.0)     Hiatal hernia     Hypertension     Kidney  stone     Lung nodule     Right lower lung    Pituitary tumor      Past Surgical History:   Procedure Laterality Date    BREAST SURGERY      right breast abscess    COSMETIC SURGERY      Breast Augmentation    EXTRACORPOREAL SHOCK WAVE LITHOTRIPSY      x 2    LITHOTRIPSY      twice in 2012, with stent , Once in 3/2015, other procedures at other facility    TUBAL LIGATION      VAGINAL DELIVERY      times 2     Family History   Problem Relation Age of Onset    Cancer Mother         Lung    Cancer Father         Lung    Cancer Paternal Uncle         Lung    Heart disease Maternal Grandfather     Cancer Paternal Grandmother         Breast     Heart disease Paternal Grandfather     No Known Problems Daughter     No Known Problems Son     Cancer Brother         Bladder    Cancer Brother         Prostate    Nephrolithiasis Neg Hx      Social History     Tobacco Use    Smoking status: Never Smoker    Smokeless tobacco: Never Used   Substance Use Topics    Alcohol use: No     Alcohol/week: 0.0 standard drinks     Frequency: Monthly or less     Drinks per session: 1 or 2     Binge frequency: Never    Drug use: No         OBJECTIVE:     Vital Signs (Most Recent)  Temp: 97.9 °F (36.6 °C) (09/22/20 1249)  Pulse: 65 (09/22/20 1249)  Resp: 16 (09/22/20 1249)  BP: 110/76 (09/22/20 1249)  SpO2: 95 % (09/22/20 1249)    Physical Exam:   :Ht 5' (1.524 m)   Wt 72.6 kg (160 lb 0.9 oz)   BMI 31.26 kg/m²                                                        GENERAL:  Comfortable, in no acute distress.                                 HEENT EXAM:  Nonicteric.  No adenopathy.  Oropharynx is clear.               NECK:  Supple.                                                               LUNGS:  Clear.                                                               CARDIAC:  Regular rate and rhythm.  S1, S2.  No murmur.                      ABDOMEN:  Obese.  Soft, positive bowel sounds, nontender.  No hepatosplenomegaly  or masses.  No rebound or guarding.                                             EXTREMITIES:  No edema.     MENTAL STATUS:  Alert and oriented.    ASSESSMENT/PLAN:     Assessment: GERD    Plan: Gastroscopy    Anesthesia Plan:   MAC / General Anaesthesia    ASA Grade: ASA 2 - Patient with mild systemic disease with no functional limitations    MALLAMPATI SCORE: II (hard and soft palate, upper portion of tonsils anduvula visible)

## 2020-09-22 NOTE — BRIEF OP NOTE
Discharge Note  Short Stay      SUMMARY     Admit Date: 9/22/2020    Attending Physician: Pillo Lopez Jr., MD     Discharge Physician: Pillo Lopez Jr., MD    Discharge Date: 9/22/2020 2:19 PM    Final Diagnosis: * No pre-op diagnosis entered *  Impression:  - Normal oropharynx.                        - Normal cricopharyngeus.                        - Normal upper third of esophagus and middle third                        of esophagus.                        - LA Grade A reflux esophagitis.                        - Z-line regular, 39 cm from the incisors.                        - Spastic lower esophageal sphincter(?).                        - No endoscopic esophageal abnormality to explain                        patient's dysphagia. Esophagus dilated, 51 Fr.                        - A medium amount of food (residue) in the stomach.                        - Gastroparesis, idiopathic etiology.                        - Gastric mucosal atrophy.                        - Normal stomach notherwise. Biopsied.                        - Normal pylorus.                        - Duodenitis of the bulb.                        - Normal examined duodenum.                        - Normal major papilla.   Recommendation:      - Discharge patient to home.                        - Await pathology and CLOtest results.                        - Continue present medications.                        - Use Protonix (pantoprazole) 40 mg PO daily.                        - Use sucralfate tablets 1 gram PO QID.                        - Add Pepcid (famotidine) 40 mg PO daily.                        - Do a gastric emptying study at appointment to be                        scheduled.                        - Return to GI clinic in 6-8 weeks.   Pillo Lopez MD   9/22/2020  Disposition: HOME OR SELF CARE    Patient Instructions:   Current Discharge Medication List      START taking these medications    Details   famotidine (PEPCID)  40 MG tablet Take 1 tablet (40 mg total) by mouth every evening.  Qty: 60 tablet, Refills: 5         CONTINUE these medications which have NOT CHANGED    Details   bromocriptine (PARLODEL) 5 mg Cap Cap 1 po at HS.  Qty: 90 capsule, Refills: 3    Associated Diagnoses: Pituitary tumor      butalbital-acetaminophen-caff -40 mg -40 mg Cap Cap 1 po as needed for headache.  May take every 8 hours if needed.  Can cause drowsiness or confusion.  Qty: 30 capsule, Refills: 1      cetirizine (ZYRTEC) 10 MG tablet Take 10 mg by mouth daily as needed. 1 Tablet Oral Every day.  as directed      cholecalciferol, vitamin D3, (VITAMIN D3) 2,000 unit Cap Take 1 capsule by mouth once daily.      coenzyme Q10 (CO Q-10) 100 mg capsule Take 100 mg by mouth 2 (two) times daily.      !! dextroamphetamine-amphetamine (ADDERALL) 20 mg tablet Take 1 tablet by mouth 2 (two) times daily.  Qty: 60 tablet, Refills: 0      !! dextroamphetamine-amphetamine (ADDERALL) 20 mg tablet Take 1 tablet by mouth 2 (two) times daily.  Qty: 60 tablet, Refills: 0      losartan (COZAAR) 50 MG tablet TAKE 1 TABLET(50 MG) BY MOUTH EVERY DAY  Qty: 90 tablet, Refills: 3    Comments: .  Associated Diagnoses: Moderate hypertension      omega-3 fatty acids 1,000 mg Cap Take 2 g by mouth.      pantoprazole (PROTONIX) 40 MG tablet Take 1 tablet (40 mg total) by mouth once daily.  Qty: 90 tablet, Refills: 3    Associated Diagnoses: GERD with esophagitis      pravastatin (PRAVACHOL) 40 MG tablet Take 1 tablet (40 mg total) by mouth every evening.  Qty: 90 tablet, Refills: 3      sucralfate (CARAFATE) 1 gram tablet Take 1 tablet (1 g total) by mouth 4 (four) times daily. dissolve in 10 mL of water and swallow  Qty: 30 tablet, Refills: 1      acyclovir 5% (ZOVIRAX) 5 % Crea Apply topically 5 (five) times daily. Shingles compound Rx ALK.  Qty: 1 Tube, Refills: 1      atorvastatin (LIPITOR) 20 MG tablet TAKE 1 TABLET(20 MG) BY MOUTH EVERY NIGHT  Qty: 90 tablet,  Refills: 3    Associated Diagnoses: Disorder of lipoprotein metabolism      capsicum 0.075% (ZOSTRIX) 0.075 % topical cream Apply topically 3 (three) times daily.  Qty: 30 g, Refills: 0    Associated Diagnoses: Herpes zoster without complication      !! dextroamphetamine-amphetamine (ADDERALL) 20 mg tablet Take 1 tablet by mouth 2 (two) times daily.  Qty: 60 tablet, Refills: 0      guaifenesin-codeine 100-10 mg/5 ml (TUSSI-ORGANIDIN NR)  mg/5 mL syrup Take 5 mLs by mouth every 6 (six) hours as needed.  Qty: 118 mL, Refills: 0    Associated Diagnoses: URI with cough and congestion      hydrocodone-chlorpheniramine (TUSSIONEX PENNKINETIC ER) 10-8 mg/5 mL suspension Take 5 mLs by mouth nightly as needed.  Qty: 35 mL, Refills: 0      ketorolac (TORADOL) 10 mg tablet Take 1 tablet (10 mg total) by mouth every 6 (six) hours.  Qty: 16 tablet, Refills: 0      methylPREDNISolone (MEDROL DOSEPACK) 4 mg tablet use as directed  Qty: 1 Package, Refills: 0    Associated Diagnoses: Acute pansinusitis, recurrence not specified      ondansetron (ZOFRAN) 4 MG tablet TK 1 T PO BID PRN NV  Refills: 9      promethazine (PHENERGAN) 25 MG tablet Take 1 tablet (25 mg total) by mouth every 6 (six) hours as needed for Nausea.  Qty: 12 tablet, Refills: 0      ranitidine (ZANTAC) 75 MG tablet Take 75 mg by mouth 2 (two) times daily as needed.       valACYclovir (VALTREX) 1000 MG tablet Take 1 tablet (1,000 mg total) by mouth 3 (three) times daily. for 7 days  Qty: 21 tablet, Refills: 0    Associated Diagnoses: Herpes zoster without complication       !! - Potential duplicate medications found. Please discuss with provider.          Discharge Procedure Orders (must include Diet, Follow-up, Activity)    Follow Up:  Follow up with PCP as per your routine.  Please follow an anti reflux diet diet.  Activity as tolerated.    No driving day of procedure.

## 2020-09-23 VITALS
RESPIRATION RATE: 16 BRPM | HEART RATE: 66 BPM | TEMPERATURE: 98 F | DIASTOLIC BLOOD PRESSURE: 74 MMHG | WEIGHT: 159 LBS | OXYGEN SATURATION: 98 % | BODY MASS INDEX: 31.22 KG/M2 | HEIGHT: 60 IN | SYSTOLIC BLOOD PRESSURE: 120 MMHG

## 2020-09-23 NOTE — ANESTHESIA POSTPROCEDURE EVALUATION
Anesthesia Post Evaluation    Patient: Anna Alas    Procedure(s) Performed: Procedure(s) (LRB):  EGD (ESOPHAGOGASTRODUODENOSCOPY) (N/A)    Final Anesthesia Type: general    Patient location during evaluation: PACU  Patient participation: Yes- Able to Participate  Level of consciousness: awake and alert, oriented and awake  Post-procedure vital signs: reviewed and stable  Pain management: adequate  Airway patency: patent    PONV status at discharge: No PONV  Anesthetic complications: no      Cardiovascular status: blood pressure returned to baseline and hemodynamically stable  Respiratory status: unassisted, spontaneous ventilation and room air  Hydration status: euvolemic  Follow-up not needed.          Vitals Value Taken Time   /74 09/22/20 1413   Temp 36.6 °C (97.8 °F) 09/22/20 1345   Pulse 66 09/22/20 1413   Resp 16 09/22/20 1413   SpO2 98 % 09/22/20 1413         Event Time   Out of Recovery 14:21:46         Pain/Shahrzad Score: Shahrzad Score: 10 (9/22/2020  2:13 PM)

## 2020-09-24 PROBLEM — E78.6 ABNORMALLY LOW HIGH DENSITY LIPOPROTEIN (HDL) CHOLESTEROL WITH HYPERTRIGLYCERIDEMIA: Status: ACTIVE | Noted: 2020-09-24

## 2020-09-24 PROBLEM — E78.1 ABNORMALLY LOW HIGH DENSITY LIPOPROTEIN (HDL) CHOLESTEROL WITH HYPERTRIGLYCERIDEMIA: Status: ACTIVE | Noted: 2020-09-24

## 2020-09-28 LAB
FINAL PATHOLOGIC DIAGNOSIS: NORMAL
GROSS: NORMAL

## 2020-09-29 PROBLEM — Z12.11 SCREEN FOR COLON CANCER: Status: ACTIVE | Noted: 2020-09-29

## 2020-10-08 DIAGNOSIS — Z01.812 PRE-PROCEDURE LAB EXAM: ICD-10-CM

## 2021-03-25 ENCOUNTER — TELEPHONE (OUTPATIENT)
Dept: GASTROENTEROLOGY | Facility: CLINIC | Age: 66
End: 2021-03-25

## 2021-04-29 ENCOUNTER — TELEPHONE (OUTPATIENT)
Dept: CARDIOLOGY | Facility: CLINIC | Age: 66
End: 2021-04-29

## 2021-05-06 ENCOUNTER — PATIENT MESSAGE (OUTPATIENT)
Dept: RESEARCH | Facility: HOSPITAL | Age: 66
End: 2021-05-06

## 2021-06-04 PROBLEM — N81.4 CYSTOCELE WITH PROLAPSE: Status: ACTIVE | Noted: 2021-06-04

## 2021-06-04 PROBLEM — N39.3 STRESS INCONTINENCE (FEMALE) (MALE): Status: ACTIVE | Noted: 2021-06-04

## 2021-06-04 PROBLEM — N81.0 URETHROCELE, FEMALE: Status: ACTIVE | Noted: 2021-06-04

## 2021-10-04 PROBLEM — K21.9 GERD WITHOUT ESOPHAGITIS: Status: ACTIVE | Noted: 2021-10-04

## 2021-11-02 PROBLEM — Z00.00 ANNUAL PHYSICAL EXAM: Status: ACTIVE | Noted: 2021-11-02

## 2022-02-07 PROBLEM — Z00.00 ANNUAL PHYSICAL EXAM: Status: RESOLVED | Noted: 2021-11-02 | Resolved: 2022-02-07

## 2022-06-02 PROBLEM — Z87.442 HISTORY OF KIDNEY STONES: Status: ACTIVE | Noted: 2022-06-02

## 2022-09-05 PROBLEM — Z00.00 ANNUAL PHYSICAL EXAM: Status: RESOLVED | Noted: 2021-11-02 | Resolved: 2022-09-05

## 2022-12-27 ENCOUNTER — TELEPHONE (OUTPATIENT)
Dept: GASTROENTEROLOGY | Facility: CLINIC | Age: 67
End: 2022-12-27
Payer: COMMERCIAL

## 2023-03-01 ENCOUNTER — TELEPHONE (OUTPATIENT)
Dept: GASTROENTEROLOGY | Facility: CLINIC | Age: 68
End: 2023-03-01
Payer: COMMERCIAL

## 2023-03-01 NOTE — TELEPHONE ENCOUNTER
Called pt in regards to scheduling referral no answer vm full. Unable to leave vm. Second attempt. Referral canceled

## 2023-03-20 ENCOUNTER — TELEPHONE (OUTPATIENT)
Dept: ENDOCRINOLOGY | Facility: CLINIC | Age: 68
End: 2023-03-20
Payer: COMMERCIAL

## 2023-03-20 PROBLEM — Z00.00 ANNUAL PHYSICAL EXAM: Status: RESOLVED | Noted: 2021-11-02 | Resolved: 2023-03-20

## 2023-03-23 ENCOUNTER — TELEPHONE (OUTPATIENT)
Dept: ENDOCRINOLOGY | Facility: CLINIC | Age: 68
End: 2023-03-23
Payer: COMMERCIAL

## 2023-03-23 NOTE — TELEPHONE ENCOUNTER
S/w pt and scheduled her to see Dr. Monaco in July and put on wait list as well.Pt verb understanding

## 2023-03-23 NOTE — TELEPHONE ENCOUNTER
----- Message from Barrington Ochoa sent at 3/23/2023 11:36 AM CDT -----  Contact: pt daughter Jose  Type: Needs Medical Advice  Who Called:  Pt daughter Jose  Best Call Back Number: [phone]  Additional Information: Pt's daughter is calling for an appt, pt has pituitary tumor  and kiarra. Calcium. Please call back to advise ASAP. Thanks

## 2023-04-14 ENCOUNTER — OFFICE VISIT (OUTPATIENT)
Dept: ENDOCRINOLOGY | Facility: CLINIC | Age: 68
End: 2023-04-14
Payer: COMMERCIAL

## 2023-04-14 VITALS
HEIGHT: 60 IN | BODY MASS INDEX: 24.77 KG/M2 | OXYGEN SATURATION: 96 % | HEART RATE: 84 BPM | WEIGHT: 126.19 LBS | DIASTOLIC BLOOD PRESSURE: 80 MMHG | SYSTOLIC BLOOD PRESSURE: 130 MMHG

## 2023-04-14 DIAGNOSIS — E83.52 HYPERCALCEMIA: ICD-10-CM

## 2023-04-14 DIAGNOSIS — K21.9 GASTROESOPHAGEAL REFLUX DISEASE, UNSPECIFIED WHETHER ESOPHAGITIS PRESENT: ICD-10-CM

## 2023-04-14 DIAGNOSIS — D35.2 PROLACTINOMA: Primary | ICD-10-CM

## 2023-04-14 PROCEDURE — 99999 PR PBB SHADOW E&M-EST. PATIENT-LVL V: ICD-10-PCS | Mod: PBBFAC,,, | Performed by: INTERNAL MEDICINE

## 2023-04-14 PROCEDURE — 4010F PR ACE/ARB THEARPY RXD/TAKEN: ICD-10-PCS | Mod: CPTII,S$GLB,, | Performed by: INTERNAL MEDICINE

## 2023-04-14 PROCEDURE — 99204 OFFICE O/P NEW MOD 45 MIN: CPT | Mod: S$GLB,,, | Performed by: INTERNAL MEDICINE

## 2023-04-14 PROCEDURE — 3008F BODY MASS INDEX DOCD: CPT | Mod: CPTII,S$GLB,, | Performed by: INTERNAL MEDICINE

## 2023-04-14 PROCEDURE — 3008F PR BODY MASS INDEX (BMI) DOCUMENTED: ICD-10-PCS | Mod: CPTII,S$GLB,, | Performed by: INTERNAL MEDICINE

## 2023-04-14 PROCEDURE — 1160F PR REVIEW ALL MEDS BY PRESCRIBER/CLIN PHARMACIST DOCUMENTED: ICD-10-PCS | Mod: CPTII,S$GLB,, | Performed by: INTERNAL MEDICINE

## 2023-04-14 PROCEDURE — 4010F ACE/ARB THERAPY RXD/TAKEN: CPT | Mod: CPTII,S$GLB,, | Performed by: INTERNAL MEDICINE

## 2023-04-14 PROCEDURE — 3079F DIAST BP 80-89 MM HG: CPT | Mod: CPTII,S$GLB,, | Performed by: INTERNAL MEDICINE

## 2023-04-14 PROCEDURE — 1159F MED LIST DOCD IN RCRD: CPT | Mod: CPTII,S$GLB,, | Performed by: INTERNAL MEDICINE

## 2023-04-14 PROCEDURE — 3075F SYST BP GE 130 - 139MM HG: CPT | Mod: CPTII,S$GLB,, | Performed by: INTERNAL MEDICINE

## 2023-04-14 PROCEDURE — 99999 PR PBB SHADOW E&M-EST. PATIENT-LVL V: CPT | Mod: PBBFAC,,, | Performed by: INTERNAL MEDICINE

## 2023-04-14 PROCEDURE — 3075F PR MOST RECENT SYSTOLIC BLOOD PRESS GE 130-139MM HG: ICD-10-PCS | Mod: CPTII,S$GLB,, | Performed by: INTERNAL MEDICINE

## 2023-04-14 PROCEDURE — 1126F AMNT PAIN NOTED NONE PRSNT: CPT | Mod: CPTII,S$GLB,, | Performed by: INTERNAL MEDICINE

## 2023-04-14 PROCEDURE — 3079F PR MOST RECENT DIASTOLIC BLOOD PRESSURE 80-89 MM HG: ICD-10-PCS | Mod: CPTII,S$GLB,, | Performed by: INTERNAL MEDICINE

## 2023-04-14 PROCEDURE — 1126F PR PAIN SEVERITY QUANTIFIED, NO PAIN PRESENT: ICD-10-PCS | Mod: CPTII,S$GLB,, | Performed by: INTERNAL MEDICINE

## 2023-04-14 PROCEDURE — 99204 PR OFFICE/OUTPT VISIT, NEW, LEVL IV, 45-59 MIN: ICD-10-PCS | Mod: S$GLB,,, | Performed by: INTERNAL MEDICINE

## 2023-04-14 PROCEDURE — 1159F PR MEDICATION LIST DOCUMENTED IN MEDICAL RECORD: ICD-10-PCS | Mod: CPTII,S$GLB,, | Performed by: INTERNAL MEDICINE

## 2023-04-14 PROCEDURE — 1160F RVW MEDS BY RX/DR IN RCRD: CPT | Mod: CPTII,S$GLB,, | Performed by: INTERNAL MEDICINE

## 2023-04-14 RX ORDER — DICYCLOMINE HYDROCHLORIDE 20 MG/1
20 TABLET ORAL 4 TIMES DAILY PRN
COMMUNITY
Start: 2023-03-20 | End: 2024-01-15

## 2023-04-14 NOTE — PROGRESS NOTES
CHIEF COMPLAINT:  Hypercalcemia/prolactinoma  67 y.o. old being seen as a new patient.    Appears has a history of a prolactinoma.  Told she had it in her 20's. Has been on bromocriptine most of that time.  Saw DR. Bose in the past. Off it for a period of time.  Had an MRI showing a micro adenoma. No galactorrhea. No vision changes. No change in ring/shoe/hat size. No excessive sweating.     State that years ago she had 2 syncopal episodes. This was in her 20's. States has episodes where she feels tremulous. Not diaphoretic. States not always better with eating. Hs not checked BG. Has had reflux for approx 8 years. Has had an EGD that showed esophagitis. No current abd pain. Had some diarrhea in the past, but not currently.     Has had off and on mild elevation in Ca since 2016. Kidney stones for approx 25 years. States has had DEXA but many years ago. No thiazides or lithium. No significant Tums intake.     Reviewed [facility] system: Saw Dr. Bose in 2005. Had a DEXA in 2005 showing osteoporosis.       PAST MEDICAL HISTORY/PAST SURGICAL HISTORY:  Reviewed in [facility]    SOCIAL HISTORY: Reviewed in [facility]    FAMILY HISTORY:  No known Ca disorders. No known pituitary disorders. No kidney stones. No osteoporosis. No hip fractures. No pancreatic disorders.     MEDICATIONS/ALLERGIES: The patient's MedCard has been updated and reviewed.            PE:    GENERAL: Well developed, well nourished.  NECK: Supple, trachea midline, no palpable thyroid nodules  CHEST: Resp even and unlabored, CTA bilateral.  CARDIAC: RRR, S1, S2 heard, no murmurs, rubs, S3, or S4  SKIN: no acanthosis nigracans.    LABS/Radiology     Latest Reference Range & Units 03/24/23 09:37   Sodium 136 - 145 mmol/L 136   Potassium 3.5 - 5.1 mmol/L 3.9   Chloride 95 - 110 mmol/L 97   CO2 22 - 31 mmol/L 29   Anion Gap 8 - 16 mmol/L 10   BUN 7 - 18 mg/dL 30 (H)   Creatinine 0.50 - 1.40 mg/dL 0.85   eGFR >60 mL/min/1.73 m^2 >60   Glucose 70 - 110 mg/dL 101    Calcium 8.4 - 10.2 mg/dL 10.6 (H)   Alkaline Phosphatase 38 - 145 U/L 119   PROTEIN TOTAL 6.0 - 8.4 g/dL 7.1   Albumin 3.5 - 5.2 g/dL 4.1   BILIRUBIN TOTAL 0.2 - 1.3 mg/dL 0.6   AST 14 - 36 U/L 25   ALT 0 - 35 U/L 18      Latest Reference Range & Units 11/02/21 14:07 12/13/22 11:31 03/24/23 09:37   Calcium 8.4 - 10.2 mg/dL 10.1 10.5 (H) 10.6 (H)   TSH 0.400 - 4.000 uIU/mL  2.430    PTH 9.0 - 77.0 pg/mL   74.5   Prolactin 5.2 - 26.5 ng/mL 18.2     (H): Data is abnormally high    MRI BRAIN W WO CONTRAST     CLINICAL HISTORY:  Neoplasm: head, CNS, recurrence, suspected/known;Neoplasm: head, CNS, rx monitor or f/u;  F/U Pituitary tumor; treated w/ meds and No surgery     TECHNIQUE:  Multiplanar multisequence thin section MR imaging of the sella was performed before and after the administration of 4.5 mL Gadavist intravenous contrast. Additional whole brain images were also obtained.     COMPARISON:  MRI brain with without contrast, 03/17/2016.     FINDINGS:  SELLAR/SUPRASELLAR REGION:     6.3 x 7.5 cm left pituitary T2 hyperintense nodule which demonstrates delayed enhancement on dynamic imaging.  Not significantly changed as remeasured on study from 03/17/2016.  The pituitary gland is otherwise normal in appearance.  The pituitary stalk is midline.  The sella has a normal configuration and is not enlarged.  Suprasellar cistern is not compressed.  No mass effect on the optic chiasm is demonstrated.  Cavernous sinuses and visualized proximal intracranial arteries are normal.     OTHER INTRACRANIAL:     Stable scattered T2 FLAIR hyperintense white matter foci are present, likely related to chronic microvascular ischemic change. No abnormal parenchymal enhancement.  No parenchymal restricted diffusion. No hydrocephalus.     SINUSES: Paranasal sinuses and mastoid air cells are clear.     ORBITS: Visualized orbits are normal.     IMPRESSION:      1. Stable size of 6.3 x 7.5 cm left pituitary microadenoma.  2. Stable  chronic microvascular ischemic change.    ASSESSMENT/PLAN:  1.  Prolactinoma-has been on bromocriptine for many years.  I would like to repeat a pituitary MRI on her.  Discussed the option of cabergoline.  However if tolerating bromocriptine, she can stay on that.  Will also check growth hormone.    2. Hypercalcemia-will be workup for primary hyperparathyroidism.  Diagnosis made based on labs and not imaging.  With having a prolactinoma and possibly primary hyperparathyroidism, that does raise concern if she has MEN1.  If that were the case there is risk of multi gland parathyroid disease.  Will get a bone density    3.  GERD-check gastrin.  Gastrin level can be high with PPI.  However, if normal that should rule out a gastrinoma    4. With a prolactinoma in possibly primary hyperparathyroidism, there is concern for multiple endocrine neoplasia type 1.  We will need to rule out a pancreatic disorder.  Advise if she has episodes mentioned in HPI, she should check her blood sugar rule out insulinoma.  I would also like check a gastrin level.  If suspicion for MEN 1 is high based on workup, could consider genetic testing      FOLLOWUP  Pituitary MRI  IGF-1, Prolactin, TSH, Ft4, Renal Panel, PTH, 24 hr urine Ca/Cr, fasting gastrin  DEXA- Hip, spine, radius

## 2023-04-18 ENCOUNTER — LAB VISIT (OUTPATIENT)
Dept: LAB | Facility: HOSPITAL | Age: 68
End: 2023-04-18
Attending: INTERNAL MEDICINE
Payer: COMMERCIAL

## 2023-04-18 DIAGNOSIS — K21.9 GASTROESOPHAGEAL REFLUX DISEASE, UNSPECIFIED WHETHER ESOPHAGITIS PRESENT: ICD-10-CM

## 2023-04-18 DIAGNOSIS — E83.52 HYPERCALCEMIA: ICD-10-CM

## 2023-04-18 DIAGNOSIS — D35.2 PROLACTINOMA: ICD-10-CM

## 2023-04-18 LAB
ALBUMIN SERPL BCP-MCNC: 3.9 G/DL (ref 3.5–5.2)
ANION GAP SERPL CALC-SCNC: 11 MMOL/L (ref 8–16)
BUN SERPL-MCNC: 16 MG/DL (ref 8–23)
CALCIUM SERPL-MCNC: 10.4 MG/DL (ref 8.7–10.5)
CHLORIDE SERPL-SCNC: 105 MMOL/L (ref 95–110)
CO2 SERPL-SCNC: 25 MMOL/L (ref 23–29)
CREAT SERPL-MCNC: 0.8 MG/DL (ref 0.5–1.4)
EST. GFR  (NO RACE VARIABLE): >60 ML/MIN/1.73 M^2
GLUCOSE SERPL-MCNC: 92 MG/DL (ref 70–110)
PHOSPHATE SERPL-MCNC: 2.6 MG/DL (ref 2.7–4.5)
POTASSIUM SERPL-SCNC: 4.1 MMOL/L (ref 3.5–5.1)
PROLACTIN SERPL IA-MCNC: 36.7 NG/ML (ref 5.2–26.5)
PTH-INTACT SERPL-MCNC: 120.7 PG/ML (ref 9–77)
SODIUM SERPL-SCNC: 141 MMOL/L (ref 136–145)
T4 FREE SERPL-MCNC: 0.82 NG/DL (ref 0.71–1.51)
TSH SERPL DL<=0.005 MIU/L-ACNC: 1.91 UIU/ML (ref 0.4–4)

## 2023-04-18 PROCEDURE — 84146 ASSAY OF PROLACTIN: CPT | Performed by: INTERNAL MEDICINE

## 2023-04-18 PROCEDURE — 80069 RENAL FUNCTION PANEL: CPT | Performed by: INTERNAL MEDICINE

## 2023-04-18 PROCEDURE — 83970 ASSAY OF PARATHORMONE: CPT | Performed by: INTERNAL MEDICINE

## 2023-04-18 PROCEDURE — 36415 COLL VENOUS BLD VENIPUNCTURE: CPT | Mod: PN | Performed by: INTERNAL MEDICINE

## 2023-04-18 PROCEDURE — 82941 ASSAY OF GASTRIN: CPT | Performed by: INTERNAL MEDICINE

## 2023-04-18 PROCEDURE — 84305 ASSAY OF SOMATOMEDIN: CPT | Performed by: INTERNAL MEDICINE

## 2023-04-18 PROCEDURE — 84443 ASSAY THYROID STIM HORMONE: CPT | Performed by: INTERNAL MEDICINE

## 2023-04-18 PROCEDURE — 84439 ASSAY OF FREE THYROXINE: CPT | Performed by: INTERNAL MEDICINE

## 2023-04-19 ENCOUNTER — HOSPITAL ENCOUNTER (OUTPATIENT)
Dept: RADIOLOGY | Facility: HOSPITAL | Age: 68
Discharge: HOME | End: 2023-04-19
Attending: INTERNAL MEDICINE
Payer: COMMERCIAL

## 2023-04-19 DIAGNOSIS — D35.2 PROLACTINOMA: ICD-10-CM

## 2023-04-19 DIAGNOSIS — E83.52 HYPERCALCEMIA: ICD-10-CM

## 2023-04-19 DIAGNOSIS — K21.9 GASTROESOPHAGEAL REFLUX DISEASE, UNSPECIFIED WHETHER ESOPHAGITIS PRESENT: ICD-10-CM

## 2023-04-19 PROCEDURE — 77081 DEXA BONE DENSITY APPENDICULAR SKELETON: ICD-10-PCS | Mod: 26,XU,, | Performed by: RADIOLOGY

## 2023-04-19 PROCEDURE — 77080 DXA BONE DENSITY AXIAL: CPT | Mod: TC,PO

## 2023-04-19 PROCEDURE — 77081 DXA BONE DENSITY APPENDICULR: CPT | Mod: 26,XU,, | Performed by: RADIOLOGY

## 2023-04-19 PROCEDURE — 77081 DXA BONE DENSITY APPENDICULR: CPT | Mod: 59,TC,PO

## 2023-04-19 PROCEDURE — 77080 DXA BONE DENSITY AXIAL SKELETON 1 OR MORE SITES: ICD-10-PCS | Mod: 26,,, | Performed by: RADIOLOGY

## 2023-04-19 PROCEDURE — 77080 DXA BONE DENSITY AXIAL: CPT | Mod: 26,,, | Performed by: RADIOLOGY

## 2023-04-20 LAB — GASTRIN SERPL-MCNC: 157 PG/ML

## 2023-04-22 LAB
IGF-I SERPL-MCNC: 109 NG/ML (ref 35–201)
IGF-I Z-SCORE SERPL: 0.39 SD

## 2023-04-26 ENCOUNTER — OFFICE VISIT (OUTPATIENT)
Dept: CARDIOLOGY | Facility: CLINIC | Age: 68
End: 2023-04-26
Payer: COMMERCIAL

## 2023-04-26 VITALS
WEIGHT: 128.06 LBS | HEIGHT: 59 IN | SYSTOLIC BLOOD PRESSURE: 132 MMHG | HEART RATE: 67 BPM | BODY MASS INDEX: 25.82 KG/M2 | DIASTOLIC BLOOD PRESSURE: 83 MMHG

## 2023-04-26 DIAGNOSIS — I10 MODERATE HYPERTENSION: Primary | ICD-10-CM

## 2023-04-26 DIAGNOSIS — E78.6 ABNORMALLY LOW HIGH DENSITY LIPOPROTEIN (HDL) CHOLESTEROL WITH HYPERTRIGLYCERIDEMIA: ICD-10-CM

## 2023-04-26 DIAGNOSIS — E78.1 ABNORMALLY LOW HIGH DENSITY LIPOPROTEIN (HDL) CHOLESTEROL WITH HYPERTRIGLYCERIDEMIA: ICD-10-CM

## 2023-04-26 PROCEDURE — 3008F BODY MASS INDEX DOCD: CPT | Mod: CPTII,S$GLB,, | Performed by: INTERNAL MEDICINE

## 2023-04-26 PROCEDURE — 99214 OFFICE O/P EST MOD 30 MIN: CPT | Mod: S$GLB,,, | Performed by: INTERNAL MEDICINE

## 2023-04-26 PROCEDURE — 3008F PR BODY MASS INDEX (BMI) DOCUMENTED: ICD-10-PCS | Mod: CPTII,S$GLB,, | Performed by: INTERNAL MEDICINE

## 2023-04-26 PROCEDURE — 4010F ACE/ARB THERAPY RXD/TAKEN: CPT | Mod: CPTII,S$GLB,, | Performed by: INTERNAL MEDICINE

## 2023-04-26 PROCEDURE — 99999 PR PBB SHADOW E&M-EST. PATIENT-LVL III: ICD-10-PCS | Mod: PBBFAC,,, | Performed by: INTERNAL MEDICINE

## 2023-04-26 PROCEDURE — 1160F RVW MEDS BY RX/DR IN RCRD: CPT | Mod: CPTII,S$GLB,, | Performed by: INTERNAL MEDICINE

## 2023-04-26 PROCEDURE — 3075F SYST BP GE 130 - 139MM HG: CPT | Mod: CPTII,S$GLB,, | Performed by: INTERNAL MEDICINE

## 2023-04-26 PROCEDURE — 3079F PR MOST RECENT DIASTOLIC BLOOD PRESSURE 80-89 MM HG: ICD-10-PCS | Mod: CPTII,S$GLB,, | Performed by: INTERNAL MEDICINE

## 2023-04-26 PROCEDURE — 1159F PR MEDICATION LIST DOCUMENTED IN MEDICAL RECORD: ICD-10-PCS | Mod: CPTII,S$GLB,, | Performed by: INTERNAL MEDICINE

## 2023-04-26 PROCEDURE — 1160F PR REVIEW ALL MEDS BY PRESCRIBER/CLIN PHARMACIST DOCUMENTED: ICD-10-PCS | Mod: CPTII,S$GLB,, | Performed by: INTERNAL MEDICINE

## 2023-04-26 PROCEDURE — 99999 PR PBB SHADOW E&M-EST. PATIENT-LVL III: CPT | Mod: PBBFAC,,, | Performed by: INTERNAL MEDICINE

## 2023-04-26 PROCEDURE — 99214 PR OFFICE/OUTPT VISIT, EST, LEVL IV, 30-39 MIN: ICD-10-PCS | Mod: S$GLB,,, | Performed by: INTERNAL MEDICINE

## 2023-04-26 PROCEDURE — 1159F MED LIST DOCD IN RCRD: CPT | Mod: CPTII,S$GLB,, | Performed by: INTERNAL MEDICINE

## 2023-04-26 PROCEDURE — 3079F DIAST BP 80-89 MM HG: CPT | Mod: CPTII,S$GLB,, | Performed by: INTERNAL MEDICINE

## 2023-04-26 PROCEDURE — 1126F AMNT PAIN NOTED NONE PRSNT: CPT | Mod: CPTII,S$GLB,, | Performed by: INTERNAL MEDICINE

## 2023-04-26 PROCEDURE — 1126F PR PAIN SEVERITY QUANTIFIED, NO PAIN PRESENT: ICD-10-PCS | Mod: CPTII,S$GLB,, | Performed by: INTERNAL MEDICINE

## 2023-04-26 PROCEDURE — 3075F PR MOST RECENT SYSTOLIC BLOOD PRESS GE 130-139MM HG: ICD-10-PCS | Mod: CPTII,S$GLB,, | Performed by: INTERNAL MEDICINE

## 2023-04-26 PROCEDURE — 4010F PR ACE/ARB THEARPY RXD/TAKEN: ICD-10-PCS | Mod: CPTII,S$GLB,, | Performed by: INTERNAL MEDICINE

## 2023-04-26 NOTE — PROGRESS NOTES
Subjective:    Patient ID:  Anna Alas is a 67 y.o. female who presents for follow-up of Hypertension      HPI  She comes with no complaints, no chest pain, no shortness of breath  She did go through a rough time earlier this year and late last year when she had some ulcer in her duodenum that needed to be repaired surgically.  No cardiac issues at this time or when she was in the hospital.      Review of Systems   Constitutional: Negative for decreased appetite, malaise/fatigue, weight gain and weight loss.   Cardiovascular:  Negative for chest pain, dyspnea on exertion, leg swelling, palpitations and syncope.   Respiratory:  Negative for cough and shortness of breath.    Gastrointestinal: Negative.    Neurological:  Negative for weakness.   All other systems reviewed and are negative.     Objective:      Physical Exam  Vitals and nursing note reviewed.   Constitutional:       Appearance: Normal appearance. She is well-developed.   HENT:      Head: Normocephalic.   Eyes:      Pupils: Pupils are equal, round, and reactive to light.   Neck:      Thyroid: No thyromegaly.      Vascular: No carotid bruit or JVD.   Cardiovascular:      Rate and Rhythm: Normal rate and regular rhythm.      Chest Wall: PMI is not displaced.      Pulses: Normal pulses and intact distal pulses.      Heart sounds: Normal heart sounds. No murmur heard.    No gallop.   Pulmonary:      Effort: Pulmonary effort is normal.      Breath sounds: Normal breath sounds.   Abdominal:      Palpations: Abdomen is soft. There is no mass.      Tenderness: There is no abdominal tenderness.   Musculoskeletal:         General: Normal range of motion.      Cervical back: Normal range of motion and neck supple.   Skin:     General: Skin is warm.   Neurological:      Mental Status: She is alert and oriented to person, place, and time.      Sensory: No sensory deficit.      Deep Tendon Reflexes: Reflexes are normal and symmetric.       Assessment:       1.  Moderate hypertension    2. 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

## 2023-04-27 ENCOUNTER — TELEPHONE (OUTPATIENT)
Dept: ENDOCRINOLOGY | Facility: CLINIC | Age: 68
End: 2023-04-27
Payer: COMMERCIAL

## 2023-04-27 NOTE — TELEPHONE ENCOUNTER
----- Message from Paty Hair sent at 4/27/2023  4:37 PM CDT -----  Type:  Sooner Apoointment Request    Caller is requesting a sooner appointment.  Caller declined first available appointment listed below.  Caller will not accept being placed on the waitlist and is requesting a message be sent to doctor.  Name of Caller:Pt   When is the first available appointment?N/A   Symptoms:f/u from labs, Mri, Bone Density  after her  05/01   Would the patient rather a call back or a response via [facility]? [facility]   Best Call Back Number:[phone]  Additional Information: pt says she can also be texted at the number

## 2023-04-27 NOTE — TELEPHONE ENCOUNTER
----- Message from Paty Hair sent at 4/27/2023  4:37 PM CDT -----  Type:  Sooner Apoointment Request    Caller is requesting a sooner appointment.  Caller declined first available appointment listed below.  Caller will not accept being placed on the waitlist and is requesting a message be sent to doctor.  Name of Caller:Pt   When is the first available appointment?N/A   Symptoms:f/u from labs, Mri, Bone Density  after her  05/01   Would the patient rather a call back or a response via [facility]? [facility]   Best Call Back Number:[phone]  Additional Information: pt says she can also be texted at the number

## 2023-05-01 ENCOUNTER — HOSPITAL ENCOUNTER (OUTPATIENT)
Dept: RADIOLOGY | Facility: HOSPITAL | Age: 68
Discharge: HOME | End: 2023-05-01
Attending: INTERNAL MEDICINE
Payer: COMMERCIAL

## 2023-05-01 DIAGNOSIS — D35.2 PROLACTINOMA: ICD-10-CM

## 2023-05-01 PROCEDURE — 70553 MRI BRAIN STEM W/O & W/DYE: CPT | Mod: TC,PO

## 2023-05-01 PROCEDURE — 25500020 PHARM REV CODE 255: Mod: PO | Performed by: INTERNAL MEDICINE

## 2023-05-01 PROCEDURE — 70553 MRI BRAIN STEM W/O & W/DYE: CPT | Mod: 26,,, | Performed by: RADIOLOGY

## 2023-05-01 PROCEDURE — 70553 MRI PITUITARY W W/O CONTRAST: ICD-10-PCS | Mod: 26,,, | Performed by: RADIOLOGY

## 2023-05-01 PROCEDURE — A9585 GADOBUTROL INJECTION: HCPCS | Mod: PO | Performed by: INTERNAL MEDICINE

## 2023-05-01 RX ORDER — GADOBUTROL 604.72 MG/ML
5 INJECTION INTRAVENOUS
Status: COMPLETED | OUTPATIENT
Start: 2023-05-01 | End: 2023-05-01

## 2023-05-01 RX ADMIN — GADOBUTROL 5 ML: 604.72 INJECTION INTRAVENOUS at 01:05

## 2023-05-05 ENCOUNTER — PATIENT MESSAGE (OUTPATIENT)
Dept: ENDOCRINOLOGY | Facility: CLINIC | Age: 68
End: 2023-05-05
Payer: COMMERCIAL

## 2023-05-10 ENCOUNTER — PATIENT MESSAGE (OUTPATIENT)
Dept: ENDOCRINOLOGY | Facility: CLINIC | Age: 68
End: 2023-05-10
Payer: COMMERCIAL

## 2023-05-10 DIAGNOSIS — E21.0 PRIMARY HYPERPARATHYROIDISM: Primary | ICD-10-CM

## 2023-05-12 ENCOUNTER — PATIENT MESSAGE (OUTPATIENT)
Dept: ENDOCRINOLOGY | Facility: CLINIC | Age: 68
End: 2023-05-12
Payer: COMMERCIAL

## 2023-05-12 NOTE — TELEPHONE ENCOUNTER
Prolactin mildly elevated. Increase bromocriptine to 5 mg daily.   MRI shows small pituitary adenoma.   The gastrin may be elevated due to her medications.   Bone density shows osteoporosis. Will discuss next steps based on parathyroid testing    Her labs suggest hyperparathyroidism  Would like to do a parathyroid sestamibi and thyroid Ultrasound.     Would like to see her after imaging. Ok to double book

## 2023-05-23 ENCOUNTER — HOSPITAL ENCOUNTER (OUTPATIENT)
Dept: RADIOLOGY | Facility: HOSPITAL | Age: 68
Discharge: HOME | End: 2023-05-23
Attending: INTERNAL MEDICINE
Payer: COMMERCIAL

## 2023-05-23 DIAGNOSIS — E21.0 PRIMARY HYPERPARATHYROIDISM: ICD-10-CM

## 2023-05-23 PROCEDURE — 76536 US EXAM OF HEAD AND NECK: CPT | Mod: TC,PO

## 2023-05-23 PROCEDURE — 78070 NM PARATHYROID SCAN PLANAR: ICD-10-PCS | Mod: 26,,, | Performed by: RADIOLOGY

## 2023-05-23 PROCEDURE — 78070 PARATHYROID PLANAR IMAGING: CPT | Mod: 26,,, | Performed by: RADIOLOGY

## 2023-05-23 PROCEDURE — 76536 US EXAM OF HEAD AND NECK: CPT | Mod: 26,,, | Performed by: RADIOLOGY

## 2023-05-23 PROCEDURE — 76536 US SOFT TISSUE HEAD NECK THYROID: ICD-10-PCS | Mod: 26,,, | Performed by: RADIOLOGY

## 2023-05-23 PROCEDURE — 78070 PARATHYROID PLANAR IMAGING: CPT | Mod: TC,PO

## 2023-06-23 ENCOUNTER — TELEPHONE (OUTPATIENT)
Dept: ENDOCRINOLOGY | Facility: CLINIC | Age: 68
End: 2023-06-23

## 2023-06-23 ENCOUNTER — OFFICE VISIT (OUTPATIENT)
Dept: ENDOCRINOLOGY | Facility: CLINIC | Age: 68
End: 2023-06-23
Payer: COMMERCIAL

## 2023-06-23 ENCOUNTER — LAB VISIT (OUTPATIENT)
Dept: LAB | Facility: HOSPITAL | Age: 68
End: 2023-06-23
Attending: INTERNAL MEDICINE
Payer: COMMERCIAL

## 2023-06-23 VITALS
HEART RATE: 66 BPM | DIASTOLIC BLOOD PRESSURE: 72 MMHG | SYSTOLIC BLOOD PRESSURE: 128 MMHG | WEIGHT: 134.56 LBS | OXYGEN SATURATION: 99 % | HEIGHT: 60 IN | BODY MASS INDEX: 26.42 KG/M2

## 2023-06-23 DIAGNOSIS — D35.2 PROLACTINOMA: ICD-10-CM

## 2023-06-23 DIAGNOSIS — E21.0 PRIMARY HYPERPARATHYROIDISM: Primary | ICD-10-CM

## 2023-06-23 DIAGNOSIS — E21.0 PRIMARY HYPERPARATHYROIDISM: ICD-10-CM

## 2023-06-23 PROCEDURE — 3008F BODY MASS INDEX DOCD: CPT | Mod: CPTII,S$GLB,, | Performed by: INTERNAL MEDICINE

## 2023-06-23 PROCEDURE — 1101F PT FALLS ASSESS-DOCD LE1/YR: CPT | Mod: CPTII,S$GLB,, | Performed by: INTERNAL MEDICINE

## 2023-06-23 PROCEDURE — 3288F PR FALLS RISK ASSESSMENT DOCUMENTED: ICD-10-PCS | Mod: CPTII,S$GLB,, | Performed by: INTERNAL MEDICINE

## 2023-06-23 PROCEDURE — 3078F PR MOST RECENT DIASTOLIC BLOOD PRESSURE < 80 MM HG: ICD-10-PCS | Mod: CPTII,S$GLB,, | Performed by: INTERNAL MEDICINE

## 2023-06-23 PROCEDURE — 99999 PR PBB SHADOW E&M-EST. PATIENT-LVL V: CPT | Mod: PBBFAC,,, | Performed by: INTERNAL MEDICINE

## 2023-06-23 PROCEDURE — 3008F PR BODY MASS INDEX (BMI) DOCUMENTED: ICD-10-PCS | Mod: CPTII,S$GLB,, | Performed by: INTERNAL MEDICINE

## 2023-06-23 PROCEDURE — 3074F PR MOST RECENT SYSTOLIC BLOOD PRESSURE < 130 MM HG: ICD-10-PCS | Mod: CPTII,S$GLB,, | Performed by: INTERNAL MEDICINE

## 2023-06-23 PROCEDURE — 81405 MOPATH PROCEDURE LEVEL 6: CPT | Performed by: INTERNAL MEDICINE

## 2023-06-23 PROCEDURE — 1159F MED LIST DOCD IN RCRD: CPT | Mod: CPTII,S$GLB,, | Performed by: INTERNAL MEDICINE

## 2023-06-23 PROCEDURE — 99999 PR PBB SHADOW E&M-EST. PATIENT-LVL V: ICD-10-PCS | Mod: PBBFAC,,, | Performed by: INTERNAL MEDICINE

## 2023-06-23 PROCEDURE — 4010F ACE/ARB THERAPY RXD/TAKEN: CPT | Mod: CPTII,S$GLB,, | Performed by: INTERNAL MEDICINE

## 2023-06-23 PROCEDURE — 99214 PR OFFICE/OUTPT VISIT, EST, LEVL IV, 30-39 MIN: ICD-10-PCS | Mod: S$GLB,,, | Performed by: INTERNAL MEDICINE

## 2023-06-23 PROCEDURE — 1101F PR PT FALLS ASSESS DOC 0-1 FALLS W/OUT INJ PAST YR: ICD-10-PCS | Mod: CPTII,S$GLB,, | Performed by: INTERNAL MEDICINE

## 2023-06-23 PROCEDURE — 1160F PR REVIEW ALL MEDS BY PRESCRIBER/CLIN PHARMACIST DOCUMENTED: ICD-10-PCS | Mod: CPTII,S$GLB,, | Performed by: INTERNAL MEDICINE

## 2023-06-23 PROCEDURE — 99214 OFFICE O/P EST MOD 30 MIN: CPT | Mod: S$GLB,,, | Performed by: INTERNAL MEDICINE

## 2023-06-23 PROCEDURE — 81404 MOPATH PROCEDURE LEVEL 5: CPT | Performed by: INTERNAL MEDICINE

## 2023-06-23 PROCEDURE — 1126F AMNT PAIN NOTED NONE PRSNT: CPT | Mod: CPTII,S$GLB,, | Performed by: INTERNAL MEDICINE

## 2023-06-23 PROCEDURE — 3078F DIAST BP <80 MM HG: CPT | Mod: CPTII,S$GLB,, | Performed by: INTERNAL MEDICINE

## 2023-06-23 PROCEDURE — 1159F PR MEDICATION LIST DOCUMENTED IN MEDICAL RECORD: ICD-10-PCS | Mod: CPTII,S$GLB,, | Performed by: INTERNAL MEDICINE

## 2023-06-23 PROCEDURE — 4010F PR ACE/ARB THEARPY RXD/TAKEN: ICD-10-PCS | Mod: CPTII,S$GLB,, | Performed by: INTERNAL MEDICINE

## 2023-06-23 PROCEDURE — 3288F FALL RISK ASSESSMENT DOCD: CPT | Mod: CPTII,S$GLB,, | Performed by: INTERNAL MEDICINE

## 2023-06-23 PROCEDURE — 1160F RVW MEDS BY RX/DR IN RCRD: CPT | Mod: CPTII,S$GLB,, | Performed by: INTERNAL MEDICINE

## 2023-06-23 PROCEDURE — 36415 COLL VENOUS BLD VENIPUNCTURE: CPT | Mod: PN | Performed by: INTERNAL MEDICINE

## 2023-06-23 PROCEDURE — 3074F SYST BP LT 130 MM HG: CPT | Mod: CPTII,S$GLB,, | Performed by: INTERNAL MEDICINE

## 2023-06-23 PROCEDURE — 1126F PR PAIN SEVERITY QUANTIFIED, NO PAIN PRESENT: ICD-10-PCS | Mod: CPTII,S$GLB,, | Performed by: INTERNAL MEDICINE

## 2023-06-23 RX ORDER — SUCRALFATE 1 G/1
1 TABLET ORAL 4 TIMES DAILY
COMMUNITY
Start: 2023-06-19 | End: 2023-09-11

## 2023-06-23 NOTE — TELEPHONE ENCOUNTER
Dr. Monaco referred pt to Dr. Brown for endocrine surgery (Primary hyperparathyroidism & Prolactinoma). Please assist in scheduling. Thank you!

## 2023-06-23 NOTE — PROGRESS NOTES
CHIEF COMPLAINT:  Hypercalcemia/prolactinoma  68 y.o. old being seen as a follow-up.  Here with daughter    Appears has a history of a prolactinoma.  Told she had it in her 20's. Has been on bromocriptine most of that time.  Saw DR. Bose in the past. Off it for a period of time.  Had an MRI showing a micro adenoma. No galactorrhea. No vision changes. No change in ring/shoe/hat size. No excessive sweating.     State that years ago she had 2 syncopal episodes. This was in her 20's. States has episodes where she feels tremulous. Not diaphoretic.  Did not check blood sugar when episodes occurred.  Has had reflux for approx 8 years. Has had an EGD that showed esophagitis. No current abd pain. Had some diarrhea in the past, but not currently.     Has had off and on mild elevation in Ca since 2016. Kidney stones for approx 25 years. States has had DEXA but many years ago. No thiazides or lithium. No significant Tums intake.     Reviewed [facility] system: Saw Dr. Bose in 2005. Had a DEXA in 2005 showing osteoporosis.       PAST MEDICAL HISTORY/PAST SURGICAL HISTORY:  Reviewed in [facility]    SOCIAL HISTORY: Reviewed in [facility]    FAMILY HISTORY:  No known Ca disorders. Had 1st cousin with pituitary disorder- unsure type. No kidney stones. No osteoporosis. No hip fractures. No pancreatic disorders.     MEDICATIONS/ALLERGIES: The patient's MedCard has been updated and reviewed.            PE:      LABS/Radiology          NM PARATHYROID SCAN PLANAR     CLINICAL HISTORY:  Primary hyperparathyroidism     TECHNIQUE:  Following IV injection of 19.7 mCi technetium 99 M sestamibi, imaging of the neck region was performed at 10 minutes and at 02:00 hours.     COMPARISON:  Correlation is made with the neck ultrasound dated 05/23/2023.     FINDINGS:  There is an oval-shaped nodular focus of persistent abnormally increased radiotracer accumulation just inferior to the left thyroid lobe which persists on the 2 hour delayed images and  corresponds to an oval-shaped 1.3 x 0.8 x 0.6 cm extrathyroidal nodule on the corresponding neck ultrasound.  The finding is most consistent with a parathyroid adenoma.     Impression:     Nodular focus of persistent increased radiotracer accumulation inferior to the left lobe of the thyroid corresponding to an oval-shaped nodule on ultrasound.  The finding is most consistent with a parathyroid adenoma.  Ms.    US SOFT TISSUE HEAD NECK THYROID     CLINICAL HISTORY:  Primary hyperparathyroidism     TECHNIQUE:  Ultrasound of the thyroid and cervical lymph nodes was performed.     COMPARISON:  None.     FINDINGS:  The right lobe of the thyroid gland measures 4.1 x 1.2 x 1.2 cm with a volume of 3.1 cc.     The left lobe of the thyroid gland measures 3.1 x 0.9 x 1.0 cm with a volume of 1.6 cc.     The isthmus measures 0.2 cm AP.  Total volume is 4.7 cc.     3 mm hypoechoic nodule in right lobe.  3 mm hypoechoic nodule left lobe.  No nodules meeting ultrasound criteria for recommendation for aspiration     Multiple prominent lymph nodes.     R 4; 1.3 x 0.6 x 0.8 cm     R2: 1 x 0.8 x 0.4 cm     L3: 2.4 x 1.1 x 0.4 cm.     L3: 0.4 x 1.6 x 0.8 cm     L3: 0.6 x 0.5 x 1.2 cm     L3: 0.5 x 0.6 x 1.0 cm     Inferior to the left lobe of the thyroid gland 1.3 by 0.8 x 0.6 cm hypoechoic structure possibly lymph node but in the region where parathyroid suggested on available images from parathyroid imaging study 05/23/2023.  Please refer to that report.  It has internal vascular flow, is hypoechoic without evident echogenic hilum seen.     Impression:     Small thyroid gland with no nodules meeting ultrasound criteria for recommendation for FNA     Mildly prominent cervical lymph stone bilaterally all with smooth cortex.     Hypoechoic structure inferior to the left lobe of the thyroid gland 1.3 x 0.6 x 0.8 cm may be lymph node but is thought more suggestive of parathyroid adenoma.        ASSESSMENT/PLAN:  1.  Prolactinoma-has been  on bromocriptine for many years.  Bromocriptine dose recently adjusted.    2. Primary hyperparathyroidism-based on workup, appears to have primary hyperparathyroidism.  Imaging does localize to the left.  However, if she has MEN1 is more common to have multi gland disease.  Therefore I would like to consult Endocrine surgery.  Discussed with patient and daughter the chances of multi gland disease.  Discussed that they may need 4 gland exploration depending on intraoperative PTH.  Discussed once she has a surgery date scheduled, to let us know so we can schedule postoperative visit.  Discussed that once she has surgery, we will repeat a bone density 1 year from the surgery to see if there is any improvement.    3.  GERD-gastric elevated, but may be due to PPI.    4. With a prolactinoma in possibly primary hyperparathyroidism, there is concern for multiple endocrine neoplasia type 1.  Check MEN1 genetic test    5.  Having some episodes of possible hypoglycemia, but not checking blood sugars.  Advised to check blood sugars any time she has an episode.  Discussed significance of this in the possibility of MEN1    FOLLOWUP  MEN1 genetic test  Endocrine surgery Consult

## 2023-06-27 ENCOUNTER — TELEPHONE (OUTPATIENT)
Dept: ENDOCRINOLOGY | Facility: CLINIC | Age: 68
End: 2023-06-27
Payer: COMMERCIAL

## 2023-06-27 DIAGNOSIS — E21.0 PRIMARY HYPERPARATHYROIDISM: Primary | ICD-10-CM

## 2023-06-27 DIAGNOSIS — D35.2 PROLACTINOMA: ICD-10-CM

## 2023-06-27 NOTE — TELEPHONE ENCOUNTER
----- Message from Yolanda Menendez LPN sent at 6/27/2023 10:44 AM CDT -----  Regarding: FW: Lab test cancellation    ----- Message -----  From: Kathy Sawant  Sent: 6/27/2023  10:42 AM CDT  To: Carlos Enrique Hair Staff (Endo)  Subject: Lab test cancellation                            There was an issue with the MEN1 test ordered on this patient from 6/23/2023.    Unfortunately, this test is no longer offered at [facility] lab for testing so the order has been cancelled. Please reorder a a Lab Miscellaneous test ([id number]) and recollect if clinically indicated.    Please call the Sendout lab at [phone] ext. [phone] if there are any questions.  Anyone in the Sendout lab will be able to assist you.

## 2023-06-27 NOTE — TELEPHONE ENCOUNTER
Pt advised specimen will need to be recollected for the MEN 1 genetic test.  Scheduled for 6/30 in [place].

## 2023-06-30 ENCOUNTER — LAB VISIT (OUTPATIENT)
Dept: LAB | Facility: HOSPITAL | Age: 68
End: 2023-06-30
Attending: INTERNAL MEDICINE
Payer: COMMERCIAL

## 2023-06-30 DIAGNOSIS — E21.0 PRIMARY HYPERPARATHYROIDISM: ICD-10-CM

## 2023-06-30 DIAGNOSIS — D35.2 PROLACTINOMA: ICD-10-CM

## 2023-06-30 PROCEDURE — 36415 COLL VENOUS BLD VENIPUNCTURE: CPT | Mod: PN | Performed by: INTERNAL MEDICINE

## 2023-07-10 ENCOUNTER — PATIENT MESSAGE (OUTPATIENT)
Dept: SURGERY | Facility: CLINIC | Age: 68
End: 2023-07-10
Payer: COMMERCIAL

## 2023-07-31 ENCOUNTER — OFFICE VISIT (OUTPATIENT)
Dept: SURGERY | Facility: CLINIC | Age: 68
End: 2023-07-31
Payer: COMMERCIAL

## 2023-07-31 ENCOUNTER — PATIENT MESSAGE (OUTPATIENT)
Dept: ENDOCRINOLOGY | Facility: CLINIC | Age: 68
End: 2023-07-31
Payer: COMMERCIAL

## 2023-07-31 VITALS
DIASTOLIC BLOOD PRESSURE: 80 MMHG | HEIGHT: 60 IN | SYSTOLIC BLOOD PRESSURE: 136 MMHG | WEIGHT: 138.56 LBS | BODY MASS INDEX: 27.2 KG/M2

## 2023-07-31 DIAGNOSIS — K21.00 GASTROESOPHAGEAL REFLUX DISEASE WITH ESOPHAGITIS WITHOUT HEMORRHAGE: ICD-10-CM

## 2023-07-31 DIAGNOSIS — E21.0 PRIMARY HYPERPARATHYROIDISM: Primary | ICD-10-CM

## 2023-07-31 DIAGNOSIS — D49.7 PITUITARY TUMOR: ICD-10-CM

## 2023-07-31 DIAGNOSIS — D35.2 PROLACTINOMA: ICD-10-CM

## 2023-07-31 DIAGNOSIS — Z87.442 HISTORY OF KIDNEY STONES: ICD-10-CM

## 2023-07-31 PROCEDURE — 99205 OFFICE O/P NEW HI 60 MIN: CPT | Mod: S$GLB,,, | Performed by: STUDENT IN AN ORGANIZED HEALTH CARE EDUCATION/TRAINING PROGRAM

## 2023-07-31 PROCEDURE — 3079F DIAST BP 80-89 MM HG: CPT | Mod: CPTII,S$GLB,, | Performed by: STUDENT IN AN ORGANIZED HEALTH CARE EDUCATION/TRAINING PROGRAM

## 2023-07-31 PROCEDURE — 99999 PR PBB SHADOW E&M-EST. PATIENT-LVL IV: CPT | Mod: PBBFAC,,, | Performed by: STUDENT IN AN ORGANIZED HEALTH CARE EDUCATION/TRAINING PROGRAM

## 2023-07-31 PROCEDURE — 1126F AMNT PAIN NOTED NONE PRSNT: CPT | Mod: CPTII,S$GLB,, | Performed by: STUDENT IN AN ORGANIZED HEALTH CARE EDUCATION/TRAINING PROGRAM

## 2023-07-31 PROCEDURE — 3075F SYST BP GE 130 - 139MM HG: CPT | Mod: CPTII,S$GLB,, | Performed by: STUDENT IN AN ORGANIZED HEALTH CARE EDUCATION/TRAINING PROGRAM

## 2023-07-31 PROCEDURE — 3288F FALL RISK ASSESSMENT DOCD: CPT | Mod: CPTII,S$GLB,, | Performed by: STUDENT IN AN ORGANIZED HEALTH CARE EDUCATION/TRAINING PROGRAM

## 2023-07-31 PROCEDURE — 3008F PR BODY MASS INDEX (BMI) DOCUMENTED: ICD-10-PCS | Mod: CPTII,S$GLB,, | Performed by: STUDENT IN AN ORGANIZED HEALTH CARE EDUCATION/TRAINING PROGRAM

## 2023-07-31 PROCEDURE — 4010F ACE/ARB THERAPY RXD/TAKEN: CPT | Mod: CPTII,S$GLB,, | Performed by: STUDENT IN AN ORGANIZED HEALTH CARE EDUCATION/TRAINING PROGRAM

## 2023-07-31 PROCEDURE — 3079F PR MOST RECENT DIASTOLIC BLOOD PRESSURE 80-89 MM HG: ICD-10-PCS | Mod: CPTII,S$GLB,, | Performed by: STUDENT IN AN ORGANIZED HEALTH CARE EDUCATION/TRAINING PROGRAM

## 2023-07-31 PROCEDURE — 1126F PR PAIN SEVERITY QUANTIFIED, NO PAIN PRESENT: ICD-10-PCS | Mod: CPTII,S$GLB,, | Performed by: STUDENT IN AN ORGANIZED HEALTH CARE EDUCATION/TRAINING PROGRAM

## 2023-07-31 PROCEDURE — 99205 PR OFFICE/OUTPT VISIT, NEW, LEVL V, 60-74 MIN: ICD-10-PCS | Mod: S$GLB,,, | Performed by: STUDENT IN AN ORGANIZED HEALTH CARE EDUCATION/TRAINING PROGRAM

## 2023-07-31 PROCEDURE — 4010F PR ACE/ARB THEARPY RXD/TAKEN: ICD-10-PCS | Mod: CPTII,S$GLB,, | Performed by: STUDENT IN AN ORGANIZED HEALTH CARE EDUCATION/TRAINING PROGRAM

## 2023-07-31 PROCEDURE — 1101F PT FALLS ASSESS-DOCD LE1/YR: CPT | Mod: CPTII,S$GLB,, | Performed by: STUDENT IN AN ORGANIZED HEALTH CARE EDUCATION/TRAINING PROGRAM

## 2023-07-31 PROCEDURE — 3075F PR MOST RECENT SYSTOLIC BLOOD PRESS GE 130-139MM HG: ICD-10-PCS | Mod: CPTII,S$GLB,, | Performed by: STUDENT IN AN ORGANIZED HEALTH CARE EDUCATION/TRAINING PROGRAM

## 2023-07-31 PROCEDURE — 1101F PR PT FALLS ASSESS DOC 0-1 FALLS W/OUT INJ PAST YR: ICD-10-PCS | Mod: CPTII,S$GLB,, | Performed by: STUDENT IN AN ORGANIZED HEALTH CARE EDUCATION/TRAINING PROGRAM

## 2023-07-31 PROCEDURE — 99999 PR PBB SHADOW E&M-EST. PATIENT-LVL IV: ICD-10-PCS | Mod: PBBFAC,,, | Performed by: STUDENT IN AN ORGANIZED HEALTH CARE EDUCATION/TRAINING PROGRAM

## 2023-07-31 PROCEDURE — 3008F BODY MASS INDEX DOCD: CPT | Mod: CPTII,S$GLB,, | Performed by: STUDENT IN AN ORGANIZED HEALTH CARE EDUCATION/TRAINING PROGRAM

## 2023-07-31 PROCEDURE — 3288F PR FALLS RISK ASSESSMENT DOCUMENTED: ICD-10-PCS | Mod: CPTII,S$GLB,, | Performed by: STUDENT IN AN ORGANIZED HEALTH CARE EDUCATION/TRAINING PROGRAM

## 2023-07-31 NOTE — ASSESSMENT & PLAN NOTE
Will provide additional referral to GI for follow up of GI complaints, elevated gastrin level (157 while on PPI), and history of esophagitis in the setting of longstanding reflux.     Ideally, she would need secretin stimulation test or repeat gastrin after holding PPI due to elevated gastrin in setting of prolactinoma    Would repeat EGD given prior EGD findings of esophagitis, continued reflux and consider gastric emptying study given previous findings. Will defer to GI.     - Continue PPI, sucralfate

## 2023-07-31 NOTE — ASSESSMENT & PLAN NOTE
Asymptomatic primary hyperparathyroidism and meets criteria for parathyroid surgery based on osteoporosis, nephrolithiasis.  Localization studies suggest possible left inferior adenoma .    - Follow up genetic testing for MEN1, if positive would alter intraoperative surgical plan  - Follow up GI work up, GI referral for gastric emptying study   - OR for parathyroidectomy with intraoperative PTH monitoring  - Start vitamin D3 2000 IU daily   - The patient has been advised to stay hydrated and avoid the use of calcium supplements

## 2023-07-31 NOTE — PROGRESS NOTES
Endocrine Surgery History & Physical     REFERRING PROVIDER: Reginaldo Monaco DO    REASON FOR VISIT: Hyperparathyroidism    HPI: Anna Alas is a 68 y.o. female patient with a history notable for prolactinoma, GERD, ADHD, HTN, h/o nephrolithiasis who presents in consultation for primary hyperparathyroidism.  Suspicion for hyperparathyroidism was raised on routine laboratory testing to have elevated calcium levels.      Details of the workup are as follows:     Recent laboratory studies:  Hypercalcemia noted since 2016  Max calcium elevation to 10.6 mg/dL  Parathyroid hormone levels normal with hypercalcemia or high normal calcium  Most recent PTH level was 120.7 with a corresponding calcium of 10.6, albumin 4.1  Phosphorus: 2.6  Vitamin D: 31 in 2020  24 hour urine calcium: 112 mg/24 hours, Benign Familial Hypocalciuric Hypercalcemia unlikely  Calcitonin 3/2023: <2.0  Gastrin 4/2023 (while on PPI): 157  Prolactin 4/2023: 36.7, elevated    Medications:  Vitamin D supplementation: none  Lithium, thiazide diuretics: none  Calcium supplements or calcimimetic: none  Bromocriptine    Symptoms:   The patient reports the following: []musculoskeletal pain, []fractures, [x]Nephrolithiasis (lithotripsy, [x]GERD, []peptic ulcer disease, []abdominal pain, []polydipsia, []polyuria, []Pruritis  Hair loss  The patient reports the following neurocognitive symptoms: []brain fog, []concentration difficulties, []fatigue, [x]forgetfulness, []mood/psychiatric disturbances  The patient denies dysphagia, globus sensation, compression symptoms, or anterior neck pain.  The patient endorses hoarseness (raspy), voice changes or increased need to clear the throat.  Bone mineral density: [x]Osteoporosis []Osteopenia []Normal bone density.  Last DEXA 2023 with T-score -3 of femur    Surgical risk factors:  Risk of concurrent thyroid disease: low; TSH WNL, US without concerning nodules   Ultrasound of the thyroid 5/23/2023   History of neck  radiation: none  Prior neck surgery: none  Cardiovascular risk: low, no recent echocardiogram  Antiplatelet therapy and anticoagulation: aspirin 81, fish oil    Family history:  No family history of endocrinopathies or endocrine cancers including pituitary tumors, pancreatic neuroendocrine tumors, medullary thyroid cancer or pheochromocytoma/paraganglioma.     Localization studies done prior to this visit:  Ultrasound: 5/2023 - suggest possible left inferior parathyroid adenoma  Nuclear Medicine Parathyroid Scan: 5/23/2023 - suggests left inferior parathyroid adenoma   4D-CT Parathyroid scan: Not obtained    LABORATORY STUDIES:  I personally and independently reviewed relevant lab test results, including the following:    Lab Results   Component Value Date    CALCIUM 10.4 04/18/2023    CALCIUM 10.6 (H) 03/24/2023    CALCIUM 10.5 (H) 12/13/2022    CALCIUM 10.1 11/02/2021    .7 (H) 04/18/2023    PTH 74.5 03/24/2023    ALBUMIN 3.9 04/18/2023    ALBUMIN 4.1 03/24/2023    ALBUMIN 4.3 12/13/2022    ALBUMIN 3.9 11/02/2021    PHOS 2.6 (L) 04/18/2023    [id number] 31 07/20/2020    TSH 1.912 04/18/2023       PAST MEDICAL HISTORY:  Patient Active Problem List   Diagnosis    Pituitary tumor    Lung mass    Encounter for long-term (current) use of medications    Moderate hypertension    Attention deficit hyperactivity disorder (ADHD), predominantly inattentive type    Herpes zoster without complication    Abnormally low high density lipoprotein (HDL) cholesterol with hypertriglyceridemia    Screen for colon cancer    Cystocele with prolapse    Gastroesophageal reflux disease with esophagitis without hemorrhage    Annual physical exam    History of kidney stones    Primary hyperparathyroidism         PAST SURGICAL HISTORY:  Past Surgical History:   Procedure Laterality Date    ANTERIOR VAGINAL REPAIR N/A 6/4/2021    Procedure: COLPORRHAPHY, ANTERIOR;  Surgeon: Robb Dooley MD;  Location: [facility];  Service: Urology;   Laterality: N/A;    BREAST SURGERY      right breast abscess    COSMETIC SURGERY      Breast Augmentation    ESOPHAGOGASTRODUODENOSCOPY N/A 9/22/2020    Procedure: EGD (ESOPHAGOGASTRODUODENOSCOPY);  Surgeon: Pillo Loepz Jr., MD;  Location: [facility] ENDO;  Service: Endoscopy;  Laterality: N/A;    EXTRACORPOREAL SHOCK WAVE LITHOTRIPSY      x 2    INSERTION OF MIDURETHRAL SLING N/A 6/4/2021    Procedure: SLING, MIDURETHRAL;  Surgeon: Robb Dooley MD;  Location: [facility] OR;  Service: Urology;  Laterality: N/A;    LITHOTRIPSY      twice in 2012, with stent , Once in 3/2015, other procedures at other facility    TUBAL LIGATION      VAGINAL DELIVERY      times 2        MEDICATIONS:  Current Outpatient Medications   Medication Sig Dispense Refill    bromocriptine (PARLODEL) 2.5 mg Tab Tab 1 po M/W/F, tabs 2 po S/S/T/T. (Patient taking differently: 5 mg once daily.) 135 tablet 4    cetirizine (ZYRTEC) 10 MG tablet Take 10 mg by mouth daily as needed. 1 Tablet Oral Every day.  as directed      coenzyme Q10 (CO Q-10) 100 mg capsule Take 400 mg by mouth once daily.       dextroamphetamine-amphetamine (ADDERALL) 20 mg tablet Take 1 tablet by mouth 2 (two) times a day. 60 tablet 0    dicyclomine (BENTYL) 20 mg tablet Take 20 mg by mouth 4 (four) times daily as needed.      losartan (COZAAR) 50 MG tablet Take 1 tablet (50 mg total) by mouth once daily. 90 tablet 4    omega-3 fatty acids 1,000 mg Cap Take 2 g by mouth once daily.       ondansetron (ZOFRAN-ODT) 8 MG TbDL DISSOLVE 1 TABLET(8 MG) ON THE TONGUE EVERY 6 HOURS AS NEEDED FOR NAUSEA 15 tablet 1    pantoprazole (PROTONIX) 40 MG tablet Take 1 tablet (40 mg total) by mouth once daily. (Patient taking differently: Take 40 mg by mouth 2 (two) times daily.) 90 tablet 4    pravastatin (PRAVACHOL) 40 MG tablet Take 1 tablet (40 mg total) by mouth every evening. 90 tablet 4    sucralfate (CARAFATE) 1 gram tablet Take 1 g by mouth 4 (four) times daily.       No current  facility-administered medications for this visit.       ALLERGIES:  Review of patient's allergies indicates:   Allergen Reactions    Fish containing products Hives     Catfish      Lisinopril      Cough    Shellfish containing products Hives     Just Shrimp (food)    Benadryl [diphenhydramine hcl] Other (See Comments)     Other reaction(s): Itching, pt can use up to 25mg without itching       SOCIAL HISTORY:  Social History     Socioeconomic History    Marital status: Single   Tobacco Use    Smoking status: Never    Smokeless tobacco: Never   Substance and Sexual Activity    Alcohol use: Yes     Alcohol/week: 0.0 standard drinks of alcohol     Comment: ocassionally < monthly    Drug use: No    Sexual activity: Not Currently     Social Determinants of Health     Financial Resource Strain: Low Risk  (4/22/2023)    Overall Financial Resource Strain (CARDIA)     Difficulty of Paying Living Expenses: Not very hard   Food Insecurity: No Food Insecurity (4/22/2023)    Hunger Vital Sign     Worried About Running Out of Food in the Last Year: Never true     Ran Out of Food in the Last Year: Never true   Transportation Needs: No Transportation Needs (4/22/2023)    PRAPARE - Transportation     Lack of Transportation (Medical): No     Lack of Transportation (Non-Medical): No   Physical Activity: Insufficiently Active (4/22/2023)    Exercise Vital Sign     Days of Exercise per Week: 2 days     Minutes of Exercise per Session: 20 min   Stress: No Stress Concern Present (4/22/2023)    [employer] - Occupational Stress Questionnaire     Feeling of Stress : Not at all   Social Connections: Unknown (4/22/2023)    Social Connection and Isolation Panel [NHANES]     Frequency of Communication with Friends and Family: More than three times a week     Frequency of Social Gatherings with Friends and Family: More than three times a week     Active Member of Clubs or Organizations: Yes     Attends Club or  "Organization Meetings: More than 4 times per year     Marital Status:    Housing Stability: Unknown (4/22/2023)    Housing Stability Vital Sign     Unable to Pay for Housing in the Last Year: No     Unstable Housing in the Last Year: No         FAMILY HISTORY:  Family History   Problem Relation Age of Onset    Cancer Mother         Lung    Heart disease Mother     Heart attack Mother     Cancer Father         Lung    Heart disease Father     Heart attack Father     Cancer Paternal Uncle         Lung    Heart disease Maternal Grandfather     Cancer Paternal Grandmother         Breast     Heart disease Paternal Grandfather     No Known Problems Daughter     No Known Problems Son     Cancer Brother         Bladder    Cancer Brother         Prostate    Nephrolithiasis Neg Hx         REVIEW OF SYSTEMS:  A detailed review of systems has been reviewed with the patient, pertinent positives and negatives are presented in the note and is otherwise negative.    PHYSICAL EXAMINATION:  Vital Signs: /80   Ht 4' 11.5" (1.511 m)   Wt 62.9 kg (138 lb 9 oz)   LMP 03/31/2010   BMI 27.52 kg/m²     Constitutional: well-developed, well-nourished, no acute distress   HENT: no lid lag, no exophthalmos, no scleral icterus, moist mucous membranes, good dentition  Neck: supple, trachea in midline, thyroid is soft and moves well with swallowing, no palpable nodules, adequate neck extension  Heme/Lymph: no cervical or supraclavicular lymphadenopathy  Respiratory: normal respiratory effort, no wheezes or stridor  Cardiovascular: regular rate and rhythm  Extremities: no edema  Skin: warm and dry, no rashes  Neurologic: no gross resting tremor of outstretched hands, voice adequate  Vascular: radial pulses palpable bilaterally  Psychiatric: affect normal    IMAGING STUDIES:  I personally and independently reviewed, visualized and interpreted the images of the below listed radiology studies (including US and NM scan) and my " findings are notable for possible left inferior adenoma.  Reports below for reference.      US Soft Tissue Head Neck Thyroid 05/23/2023    Narrative  EXAMINATION:  US SOFT TISSUE HEAD NECK THYROID    CLINICAL HISTORY:  Primary hyperparathyroidism    TECHNIQUE:  Ultrasound of the thyroid and cervical lymph nodes was performed.    COMPARISON:  None.    FINDINGS:  The right lobe of the thyroid gland measures 4.1 x 1.2 x 1.2 cm with a volume of 3.1 cc.  The left lobe of the thyroid gland measures 3.1 x 0.9 x 1.0 cm with a volume of 1.6 cc.  The isthmus measures 0.2 cm AP.  Total volume is 4.7 cc.    3 mm hypoechoic nodule in right lobe.  3 mm hypoechoic nodule left lobe.  No nodules meeting ultrasound criteria for recommendation for aspiration    Multiple prominent lymph nodes.    R 4; 1.3 x 0.6 x 0.8 cm  R2: 1 x 0.8 x 0.4 cm  L3: 2.4 x 1.1 x 0.4 cm.  L3: 0.4 x 1.6 x 0.8 cm  L3: 0.6 x 0.5 x 1.2 cm  L3: 0.5 x 0.6 x 1.0 cm    Inferior to the left lobe of the thyroid gland 1.3 by 0.8 x 0.6 cm hypoechoic structure possibly lymph node but in the region where parathyroid suggested on available images from parathyroid imaging study 05/23/2023.  Please refer to that report.  It has internal vascular flow, is hypoechoic without evident echogenic hilum seen.    Impression  Small thyroid gland with no nodules meeting ultrasound criteria for recommendation for FNA    Mildly prominent cervical lymph stone bilaterally all with smooth cortex.    Hypoechoic structure inferior to the left lobe of the thyroid gland 1.3 x 0.6 x 0.8 cm may be lymph node but is thought more suggestive of parathyroid adenoma.    Electronically signed by: Yoana Shore MD  Date:    05/23/2023  Time:    14:40      DXA Bone Density Appendicular Skeleton 04/19/2023    Narrative  EXAMINATION:  DEXA BONE DENSITY APPENDICULAR SKELETON    CLINICAL HISTORY:  Benign neoplasm of pituitary gland    TECHNIQUE:  DXA scanning was performed over the left forearm.  Review  of the images confirms satisfactory positioning and technique.    COMPARISON:  None    FINDINGS:  The left forearm bone mineral density is equal to 0.409 g/cm squared with a Z score of -3.0.  There has been  no significant change relative to the prior study.    Impression  Osteoporosis of the appendicular skeleton    A Z score less than -2 is considered below the expected range for age.  Osteoporosis can not be diagnosed in men under the age of 50 or premenopausal women on the basis of BMD alone.      Electronically signed by: Jayant Feliciano MD  Date:    04/19/2023  Time:    13:39      DXA Bone Density Axial Skeleton 1 or more sites 04/19/2023    Narrative  EXAMINATION:  DXA BONE DENSITY AXIAL SKELETON 1 OR MORE SITES    CLINICAL HISTORY:  Benign neoplasm of pituitary gland    TECHNIQUE:  DXA scanning was performed over the left hip and lumbar spine.  Review of the images confirms satisfactory positioning and technique.    COMPARISON:  None    FINDINGS:  The L1 to L4 vertebral bone mineral density is equal to 0.704 g/cm squared with a T score of -3.1.    The left femoral neck bone mineral density is equal to 0.570 g/cm squared with a T score of -2.5.    There is a 13% risk of a major osteoporotic fracture and a 2.7% risk of hip fracture in the next 10 years (FRAX).    Impression  Osteoporosis of the left hip, osteoporosis of the lumbar spine    NM Parathyroid Scan Planar 05/23/2023    Narrative  EXAMINATION:  NM PARATHYROID SCAN PLANAR    CLINICAL HISTORY:  Primary hyperparathyroidism    TECHNIQUE:  Following IV injection of 19.7 mCi technetium 99 M sestamibi, imaging of the neck region was performed at 10 minutes and at 02:00 hours.    COMPARISON:  Correlation is made with the neck ultrasound dated 05/23/2023.    FINDINGS:  There is an oval-shaped nodular focus of persistent abnormally increased radiotracer accumulation just inferior to the left thyroid lobe which persists on the 2 hour delayed images and  corresponds to an oval-shaped 1.3 x 0.8 x 0.6 cm extrathyroidal nodule on the corresponding neck ultrasound.  The finding is most consistent with a parathyroid adenoma.    Impression  Nodular focus of persistent increased radiotracer accumulation inferior to the left lobe of the thyroid corresponding to an oval-shaped nodule on ultrasound.  The finding is most consistent with a parathyroid adenoma.      Electronically signed by: Lawson Killian MD  Date:    05/23/2023  Time:    14:54        IMPRESSION:  I had the pleasure of seeing Ms. Alas in Endocrine Surgical consultation regarding the biochemical diagnosis of primary hyperparathyroidism.     We discussed that hyperparathyroidism can compromise bone and cardiovascular health. In addition to classic symptoms such as kidney stones and bone loss, hyperparathyroidism can be associated with multiple symptoms including fatigue, depression, memory loss, pruritis, polyuria, nocturia, constipation, polydipsia, and musculoskeletal aches and pains. Hyperparathyroidism can also worsen hypertension.  I discussed the implications of non-operative observation as well as the standard of care surgical treatment of primary hyperparathyroidism and my recommendation for parathyroidectomy.      We extensively discussed the pros and cons for surgical intervention, in this case parathyroidectomy with intraoperative parathyroid hormone monitoring.  We discussed that in the majority of cases, a single adenoma is the culprit gland. However, multigland disease is possible and multigland disease has a lower likelihood of radiographic localization.  Parathyroidectomy for single gland disease is a same day surgery while four-gland parathyroid exploration may require an overnight stay. We discussed that in all cases, parathyroidectomy has an attendant risk of postoperative hypocalcemia which can be mitigated with calcium and vitamin D supplementation. Other possible complications associated  with this may include, but may not be restricted to hoarseness, recurrent laryngeal nerve injury - temporary or permanent, superior laryngeal nerve injury - temporary or permanent, hypocalcemia and hypoparathyroidism - temporary or permanent, neck hematoma, bleeding, infection, scarring, wound healing problems and possible death. We discussed that in rare cases, parathyroid exploration may be negative. Recurrent and persistent disease are also possible.      All questions were answered and the patient expressed understanding of all the risks, benefits and alternatives, and agreed to proceed with the plan despite the risks.  Surgical consent was signed and witnessed.        Problem List Items Addressed This Visit          Renal/    History of kidney stones     Indication for parathyroidectomy, see below.            Oncology    Pituitary tumor     Prolactinoma, currently on bromocriptine.  MEN I genetic testing pending, drawn 6/30.            Endocrine    Primary hyperparathyroidism     Asymptomatic primary hyperparathyroidism and meets criteria for parathyroid surgery based on osteoporosis, nephrolithiasis.  Localization studies suggest possible left inferior adenoma .    - Follow up genetic testing for MEN1, if positive would alter intraoperative surgical plan  - Follow up GI work up, GI referral for gastric emptying study   - OR for parathyroidectomy with intraoperative PTH monitoring  - Start vitamin D3 2000 IU daily   - The patient has been advised to stay hydrated and avoid the use of calcium supplements         Relevant Orders    Case Request Operating Room: PARATHYROIDECTOMY (Completed)    Renal Function Panel       GI    Gastroesophageal reflux disease with esophagitis without hemorrhage     Will provide additional referral to GI for follow up of GI complaints, elevated gastrin level (157 while on PPI), and history of esophagitis in the setting of longstanding reflux.     Ideally, she would need secretin  stimulation test or repeat gastrin after holding PPI due to elevated gastrin in setting of prolactinoma    Would repeat EGD given prior EGD findings of esophagitis, continued reflux and consider gastric emptying study given previous findings. Will defer to GI.     - Continue PPI, sucralfate            Other Visit Diagnoses       Prolactinoma               Patient was seen and evaluated with surgery resident Wendy Butt MD.    Smitha Brown MD  Staff Surgeon  Endocrine Surgery  7/31/23

## 2023-08-01 NOTE — TELEPHONE ENCOUNTER
S/W [facility] with main campus lab.  Sample was collected on 6/30 but order was canceled d/t it being collected in the wrong tube.  Please advise.

## 2023-08-01 NOTE — TELEPHONE ENCOUNTER
See note from 6/27. Looks like it had to be reordered. Orders were placed but not sure if she had it done

## 2023-08-01 NOTE — TELEPHONE ENCOUNTER
S/W sendout lab, order is in correctly, needs to be collected in purple top tube (included in appt note).  Lab sched for 8/4 in [place].   Pt is aware.

## 2023-08-01 NOTE — TELEPHONE ENCOUNTER
I replaced the miscellaneous order. Can someone from the send out lab verify that the order was placed correct. This is the 3rd attempt to order the lab.     I dont think we were notified that they did not draw it. We were told on 6/27 when the initial one was done.

## 2023-08-04 ENCOUNTER — LAB VISIT (OUTPATIENT)
Dept: LAB | Facility: HOSPITAL | Age: 68
End: 2023-08-04
Attending: INTERNAL MEDICINE
Payer: COMMERCIAL

## 2023-08-04 DIAGNOSIS — E21.0 PRIMARY HYPERPARATHYROIDISM: ICD-10-CM

## 2023-08-04 PROCEDURE — 36415 COLL VENOUS BLD VENIPUNCTURE: CPT | Mod: PO | Performed by: INTERNAL MEDICINE

## 2023-08-22 ENCOUNTER — PATIENT MESSAGE (OUTPATIENT)
Dept: ENDOCRINOLOGY | Facility: CLINIC | Age: 68
End: 2023-08-22
Payer: COMMERCIAL

## 2023-08-22 DIAGNOSIS — E21.0 PRIMARY HYPERPARATHYROIDISM: Primary | ICD-10-CM

## 2023-08-23 NOTE — TELEPHONE ENCOUNTER
Check a Renal Panel  May want to reach out to PCP on diarrhea. Dont thing that would be Ca related

## 2023-08-25 ENCOUNTER — LAB VISIT (OUTPATIENT)
Dept: LAB | Facility: HOSPITAL | Age: 68
End: 2023-08-25
Attending: INTERNAL MEDICINE
Payer: COMMERCIAL

## 2023-08-25 DIAGNOSIS — E21.0 PRIMARY HYPERPARATHYROIDISM: ICD-10-CM

## 2023-08-25 LAB
ALBUMIN SERPL BCP-MCNC: 3.7 G/DL (ref 3.5–5.2)
ANION GAP SERPL CALC-SCNC: 9 MMOL/L (ref 8–16)
BUN SERPL-MCNC: 27 MG/DL (ref 8–23)
CALCIUM SERPL-MCNC: 10.3 MG/DL (ref 8.7–10.5)
CHLORIDE SERPL-SCNC: 108 MMOL/L (ref 95–110)
CO2 SERPL-SCNC: 23 MMOL/L (ref 23–29)
CREAT SERPL-MCNC: 0.8 MG/DL (ref 0.5–1.4)
EST. GFR  (NO RACE VARIABLE): >60 ML/MIN/1.73 M^2
GLUCOSE SERPL-MCNC: 97 MG/DL (ref 70–110)
PHOSPHATE SERPL-MCNC: 2.7 MG/DL (ref 2.7–4.5)
POTASSIUM SERPL-SCNC: 4.1 MMOL/L (ref 3.5–5.1)
SODIUM SERPL-SCNC: 140 MMOL/L (ref 136–145)

## 2023-08-25 PROCEDURE — 36415 COLL VENOUS BLD VENIPUNCTURE: CPT | Mod: PN | Performed by: INTERNAL MEDICINE

## 2023-08-25 PROCEDURE — 80069 RENAL FUNCTION PANEL: CPT | Performed by: INTERNAL MEDICINE

## 2023-08-28 ENCOUNTER — TELEPHONE (OUTPATIENT)
Dept: ENDOCRINOLOGY | Facility: CLINIC | Age: 68
End: 2023-08-28
Payer: COMMERCIAL

## 2023-09-04 PROBLEM — Z00.00 ANNUAL PHYSICAL EXAM: Status: RESOLVED | Noted: 2021-11-02 | Resolved: 2023-09-04

## 2023-09-07 ENCOUNTER — PATIENT MESSAGE (OUTPATIENT)
Dept: ENDOCRINOLOGY | Facility: CLINIC | Age: 68
End: 2023-09-07
Payer: COMMERCIAL

## 2023-09-12 ENCOUNTER — PATIENT MESSAGE (OUTPATIENT)
Dept: SURGERY | Facility: HOSPITAL | Age: 68
End: 2023-09-12
Payer: COMMERCIAL

## 2023-09-12 ENCOUNTER — TELEPHONE (OUTPATIENT)
Dept: SURGERY | Facility: CLINIC | Age: 68
End: 2023-09-12
Payer: COMMERCIAL

## 2023-09-12 ENCOUNTER — ANESTHESIA EVENT (OUTPATIENT)
Dept: SURGERY | Facility: HOSPITAL | Age: 68
End: 2023-09-12
Payer: COMMERCIAL

## 2023-09-12 ENCOUNTER — PATIENT MESSAGE (OUTPATIENT)
Dept: SURGERY | Facility: CLINIC | Age: 68
End: 2023-09-12
Payer: COMMERCIAL

## 2023-09-12 RX ORDER — ASPIRIN 81 MG/1
81 TABLET ORAL DAILY
COMMUNITY

## 2023-09-12 NOTE — PRE-PROCEDURE INSTRUCTIONS
PreOp Instructions given:   - Verbal medication information (what to hold and what to take)   - NPO guidelines   - Arrival place directions given; time to be given the day before procedure by the   Surgeon's Office DOSC  - Bathing with antibacterial soap   - Don't wear any jewelry or bring any valuables AM of surgery   - No makeup or moisturizer to face   - No perfume/cologne, powder, lotions or aftershave   Pt. verbalized understanding.   Pt denies any h/o Anesthesia/Sedation complications or side effects.

## 2023-09-13 ENCOUNTER — PATIENT MESSAGE (OUTPATIENT)
Dept: PLASTIC SURGERY | Facility: CLINIC | Age: 68
End: 2023-09-13
Payer: COMMERCIAL

## 2023-09-13 ENCOUNTER — HOSPITAL ENCOUNTER (OUTPATIENT)
Facility: HOSPITAL | Age: 68
Discharge: HOME | End: 2023-09-13
Attending: STUDENT IN AN ORGANIZED HEALTH CARE EDUCATION/TRAINING PROGRAM | Admitting: STUDENT IN AN ORGANIZED HEALTH CARE EDUCATION/TRAINING PROGRAM
Payer: COMMERCIAL

## 2023-09-13 ENCOUNTER — ANESTHESIA (OUTPATIENT)
Dept: SURGERY | Facility: HOSPITAL | Age: 68
End: 2023-09-13
Payer: COMMERCIAL

## 2023-09-13 VITALS
DIASTOLIC BLOOD PRESSURE: 76 MMHG | HEART RATE: 71 BPM | BODY MASS INDEX: 28.02 KG/M2 | RESPIRATION RATE: 20 BRPM | TEMPERATURE: 98 F | OXYGEN SATURATION: 94 % | WEIGHT: 139 LBS | SYSTOLIC BLOOD PRESSURE: 142 MMHG | HEIGHT: 59 IN

## 2023-09-13 DIAGNOSIS — E21.0 PRIMARY HYPERPARATHYROIDISM: Primary | ICD-10-CM

## 2023-09-13 DIAGNOSIS — R07.9 CHEST PAIN: ICD-10-CM

## 2023-09-13 LAB
PTH-INTACT SERPL-MCNC: 103.4 PG/ML (ref 9–77)
PTH-INTACT SERPL-MCNC: 191 PG/ML (ref 9–77)
PTH-INTACT SERPL-MCNC: 62.3 PG/ML (ref 9–77)
PTH-INTACT SERPL-MCNC: 80.6 PG/ML (ref 9–77)
PTH-INTACT SERPL-MCNC: 84.5 PG/ML (ref 9–77)
PTH-INTACT SERPL-MCNC: 90.9 PG/ML (ref 9–77)
PTH-INTACT SERPL-MCNC: 97.6 PG/ML (ref 9–77)

## 2023-09-13 PROCEDURE — 27000221 HC OXYGEN, UP TO 24 HOURS

## 2023-09-13 PROCEDURE — 36620 ARTERIAL: ICD-10-PCS | Mod: 59,,, | Performed by: STUDENT IN AN ORGANIZED HEALTH CARE EDUCATION/TRAINING PROGRAM

## 2023-09-13 PROCEDURE — 88305 TISSUE EXAM BY PATHOLOGIST: CPT | Mod: 26,,, | Performed by: PATHOLOGY

## 2023-09-13 PROCEDURE — D9220A PRA ANESTHESIA: ICD-10-PCS | Mod: ,,, | Performed by: STUDENT IN AN ORGANIZED HEALTH CARE EDUCATION/TRAINING PROGRAM

## 2023-09-13 PROCEDURE — 25000003 PHARM REV CODE 250

## 2023-09-13 PROCEDURE — 63600175 PHARM REV CODE 636 W HCPCS

## 2023-09-13 PROCEDURE — 88305 TISSUE EXAM BY PATHOLOGIST: ICD-10-PCS | Mod: 26,,, | Performed by: PATHOLOGY

## 2023-09-13 PROCEDURE — 37000008 HC ANESTHESIA 1ST 15 MINUTES: Performed by: STUDENT IN AN ORGANIZED HEALTH CARE EDUCATION/TRAINING PROGRAM

## 2023-09-13 PROCEDURE — 88331 PATH CONSLTJ SURG 1 BLK 1SPC: CPT | Mod: 59 | Performed by: PATHOLOGY

## 2023-09-13 PROCEDURE — 60500 PR EXPLORE PARATHYROID GLANDS: ICD-10-PCS | Mod: ,,, | Performed by: STUDENT IN AN ORGANIZED HEALTH CARE EDUCATION/TRAINING PROGRAM

## 2023-09-13 PROCEDURE — 27201423 OPTIME MED/SURG SUP & DEVICES STERILE SUPPLY: Performed by: STUDENT IN AN ORGANIZED HEALTH CARE EDUCATION/TRAINING PROGRAM

## 2023-09-13 PROCEDURE — D9220A PRA ANESTHESIA: Mod: ,,, | Performed by: STUDENT IN AN ORGANIZED HEALTH CARE EDUCATION/TRAINING PROGRAM

## 2023-09-13 PROCEDURE — 71000016 HC POSTOP RECOV ADDL HR: Performed by: STUDENT IN AN ORGANIZED HEALTH CARE EDUCATION/TRAINING PROGRAM

## 2023-09-13 PROCEDURE — 27201037 HC PRESSURE MONITORING SET UP

## 2023-09-13 PROCEDURE — 83970 ASSAY OF PARATHORMONE: CPT | Mod: 91 | Performed by: STUDENT IN AN ORGANIZED HEALTH CARE EDUCATION/TRAINING PROGRAM

## 2023-09-13 PROCEDURE — 93010 EKG 12-LEAD: ICD-10-PCS | Mod: ,,, | Performed by: INTERNAL MEDICINE

## 2023-09-13 PROCEDURE — 60500 EXPLORE PARATHYROID GLANDS: CPT | Mod: ,,, | Performed by: STUDENT IN AN ORGANIZED HEALTH CARE EDUCATION/TRAINING PROGRAM

## 2023-09-13 PROCEDURE — 88331 PATH CONSLTJ SURG 1 BLK 1SPC: CPT | Mod: 26,,, | Performed by: PATHOLOGY

## 2023-09-13 PROCEDURE — 36620 INSERTION CATHETER ARTERY: CPT | Mod: 59,,, | Performed by: STUDENT IN AN ORGANIZED HEALTH CARE EDUCATION/TRAINING PROGRAM

## 2023-09-13 PROCEDURE — 88305 TISSUE EXAM BY PATHOLOGIST: CPT | Performed by: PATHOLOGY

## 2023-09-13 PROCEDURE — 36000707: Performed by: STUDENT IN AN ORGANIZED HEALTH CARE EDUCATION/TRAINING PROGRAM

## 2023-09-13 PROCEDURE — 88331 PR  PATH CONSULT IN SURG,W FRZ SEC: ICD-10-PCS | Mod: 26,,, | Performed by: PATHOLOGY

## 2023-09-13 PROCEDURE — 93010 ELECTROCARDIOGRAM REPORT: CPT | Mod: ,,, | Performed by: INTERNAL MEDICINE

## 2023-09-13 PROCEDURE — 63600175 PHARM REV CODE 636 W HCPCS: Performed by: STUDENT IN AN ORGANIZED HEALTH CARE EDUCATION/TRAINING PROGRAM

## 2023-09-13 PROCEDURE — 93005 ELECTROCARDIOGRAM TRACING: CPT

## 2023-09-13 PROCEDURE — 71000015 HC POSTOP RECOV 1ST HR: Performed by: STUDENT IN AN ORGANIZED HEALTH CARE EDUCATION/TRAINING PROGRAM

## 2023-09-13 PROCEDURE — 71000033 HC RECOVERY, INTIAL HOUR: Performed by: STUDENT IN AN ORGANIZED HEALTH CARE EDUCATION/TRAINING PROGRAM

## 2023-09-13 PROCEDURE — 37000009 HC ANESTHESIA EA ADD 15 MINS: Performed by: STUDENT IN AN ORGANIZED HEALTH CARE EDUCATION/TRAINING PROGRAM

## 2023-09-13 PROCEDURE — 94761 N-INVAS EAR/PLS OXIMETRY MLT: CPT

## 2023-09-13 PROCEDURE — 36000706: Performed by: STUDENT IN AN ORGANIZED HEALTH CARE EDUCATION/TRAINING PROGRAM

## 2023-09-13 RX ORDER — EPHEDRINE SULFATE 50 MG/ML
INJECTION, SOLUTION INTRAVENOUS
Status: DISCONTINUED | OUTPATIENT
Start: 2023-09-13 | End: 2023-09-13

## 2023-09-13 RX ORDER — DEXAMETHASONE SODIUM PHOSPHATE 4 MG/ML
INJECTION, SOLUTION INTRA-ARTICULAR; INTRALESIONAL; INTRAMUSCULAR; INTRAVENOUS; SOFT TISSUE
Status: DISCONTINUED | OUTPATIENT
Start: 2023-09-13 | End: 2023-09-13

## 2023-09-13 RX ORDER — CALCIUM CARBONATE 400(1000)
2 TABLET,CHEWABLE ORAL 2 TIMES DAILY WITH MEALS
Refills: 0 | COMMUNITY
Start: 2023-09-13 | End: 2023-09-25 | Stop reason: ALTCHOICE

## 2023-09-13 RX ORDER — PHENYLEPHRINE HCL IN 0.9% NACL 1 MG/10 ML
SYRINGE (ML) INTRAVENOUS
Status: DISCONTINUED | OUTPATIENT
Start: 2023-09-13 | End: 2023-09-13

## 2023-09-13 RX ORDER — LIDOCAINE HYDROCHLORIDE 20 MG/ML
INJECTION INTRAVENOUS
Status: DISCONTINUED | OUTPATIENT
Start: 2023-09-13 | End: 2023-09-13

## 2023-09-13 RX ORDER — FENTANYL CITRATE 50 UG/ML
INJECTION, SOLUTION INTRAMUSCULAR; INTRAVENOUS
Status: DISCONTINUED | OUTPATIENT
Start: 2023-09-13 | End: 2023-09-13

## 2023-09-13 RX ORDER — HALOPERIDOL 5 MG/ML
0.5 INJECTION INTRAMUSCULAR EVERY 10 MIN PRN
Status: DISCONTINUED | OUTPATIENT
Start: 2023-09-13 | End: 2023-09-13 | Stop reason: HOSPADM

## 2023-09-13 RX ORDER — SUCCINYLCHOLINE CHLORIDE 20 MG/ML
INJECTION INTRAMUSCULAR; INTRAVENOUS
Status: DISCONTINUED | OUTPATIENT
Start: 2023-09-13 | End: 2023-09-13

## 2023-09-13 RX ORDER — SODIUM CHLORIDE 0.9 % (FLUSH) 0.9 %
10 SYRINGE (ML) INJECTION
Status: DISCONTINUED | OUTPATIENT
Start: 2023-09-13 | End: 2023-09-13 | Stop reason: HOSPADM

## 2023-09-13 RX ORDER — BUPIVACAINE HYDROCHLORIDE 2.5 MG/ML
INJECTION, SOLUTION EPIDURAL; INFILTRATION; INTRACAUDAL
Status: DISCONTINUED | OUTPATIENT
Start: 2023-09-13 | End: 2023-09-13 | Stop reason: HOSPADM

## 2023-09-13 RX ORDER — PROPOFOL 10 MG/ML
VIAL (ML) INTRAVENOUS
Status: DISCONTINUED | OUTPATIENT
Start: 2023-09-13 | End: 2023-09-13

## 2023-09-13 RX ORDER — ONDANSETRON 2 MG/ML
INJECTION INTRAMUSCULAR; INTRAVENOUS
Status: DISCONTINUED | OUTPATIENT
Start: 2023-09-13 | End: 2023-09-13

## 2023-09-13 RX ORDER — DEXMEDETOMIDINE HYDROCHLORIDE 100 UG/ML
INJECTION, SOLUTION INTRAVENOUS
Status: DISCONTINUED | OUTPATIENT
Start: 2023-09-13 | End: 2023-09-13

## 2023-09-13 RX ORDER — KETAMINE HCL IN 0.9 % NACL 50 MG/5 ML
SYRINGE (ML) INTRAVENOUS
Status: DISCONTINUED | OUTPATIENT
Start: 2023-09-13 | End: 2023-09-13

## 2023-09-13 RX ORDER — MIDAZOLAM HYDROCHLORIDE 5 MG/ML
INJECTION INTRAMUSCULAR; INTRAVENOUS
Status: DISCONTINUED | OUTPATIENT
Start: 2023-09-13 | End: 2023-09-13

## 2023-09-13 RX ORDER — OXYCODONE HYDROCHLORIDE 5 MG/1
5 TABLET ORAL EVERY 6 HOURS PRN
Qty: 8 TABLET | Refills: 0 | Status: SHIPPED | OUTPATIENT
Start: 2023-09-13 | End: 2023-09-25

## 2023-09-13 RX ORDER — PHENYLEPHRINE HYDROCHLORIDE 10 MG/ML
INJECTION INTRAVENOUS
Status: DISCONTINUED | OUTPATIENT
Start: 2023-09-13 | End: 2023-09-13

## 2023-09-13 RX ORDER — HYDROMORPHONE HYDROCHLORIDE 1 MG/ML
0.2 INJECTION, SOLUTION INTRAMUSCULAR; INTRAVENOUS; SUBCUTANEOUS EVERY 5 MIN PRN
Status: DISCONTINUED | OUTPATIENT
Start: 2023-09-13 | End: 2023-09-13 | Stop reason: HOSPADM

## 2023-09-13 RX ADMIN — ONDANSETRON 4 MG: 2 INJECTION INTRAMUSCULAR; INTRAVENOUS at 11:09

## 2023-09-13 RX ADMIN — PHENYLEPHRINE HYDROCHLORIDE 100 MCG: 10 INJECTION INTRAVENOUS at 08:09

## 2023-09-13 RX ADMIN — Medication 20 MG: at 10:09

## 2023-09-13 RX ADMIN — SODIUM CHLORIDE 0.05 MCG/KG/MIN: 9 INJECTION, SOLUTION INTRAVENOUS at 08:09

## 2023-09-13 RX ADMIN — DEXMEDETOMIDINE 12 MCG: 100 INJECTION, SOLUTION, CONCENTRATE INTRAVENOUS at 09:09

## 2023-09-13 RX ADMIN — SUCCINYLCHOLINE CHLORIDE 100 MG: 20 INJECTION, SOLUTION INTRAMUSCULAR; INTRAVENOUS at 08:09

## 2023-09-13 RX ADMIN — Medication 10 MG: at 11:09

## 2023-09-13 RX ADMIN — EPHEDRINE SULFATE 10 MG: 50 INJECTION INTRAVENOUS at 08:09

## 2023-09-13 RX ADMIN — HYDROMORPHONE HYDROCHLORIDE 0.2 MG: 1 INJECTION, SOLUTION INTRAMUSCULAR; INTRAVENOUS; SUBCUTANEOUS at 01:09

## 2023-09-13 RX ADMIN — MIDAZOLAM 2 MG: 5 INJECTION INTRAMUSCULAR; INTRAVENOUS at 08:09

## 2023-09-13 RX ADMIN — PHENYLEPHRINE HYDROCHLORIDE 0.25 MCG/KG/MIN: 10 INJECTION INTRAVENOUS at 09:09

## 2023-09-13 RX ADMIN — LIDOCAINE HYDROCHLORIDE 100 MG: 20 INJECTION INTRAVENOUS at 08:09

## 2023-09-13 RX ADMIN — SODIUM CHLORIDE, SODIUM GLUCONATE, SODIUM ACETATE, POTASSIUM CHLORIDE, MAGNESIUM CHLORIDE, SODIUM PHOSPHATE, DIBASIC, AND POTASSIUM PHOSPHATE: .53; .5; .37; .037; .03; .012; .00082 INJECTION, SOLUTION INTRAVENOUS at 08:09

## 2023-09-13 RX ADMIN — DEXAMETHASONE SODIUM PHOSPHATE 4 MG: 4 INJECTION, SOLUTION INTRAMUSCULAR; INTRAVENOUS at 11:09

## 2023-09-13 RX ADMIN — SODIUM CHLORIDE: 0.9 INJECTION, SOLUTION INTRAVENOUS at 08:09

## 2023-09-13 RX ADMIN — DEXMEDETOMIDINE 8 MCG: 100 INJECTION, SOLUTION, CONCENTRATE INTRAVENOUS at 11:09

## 2023-09-13 RX ADMIN — EPHEDRINE SULFATE 5 MG: 50 INJECTION INTRAVENOUS at 11:09

## 2023-09-13 RX ADMIN — SODIUM CHLORIDE, SODIUM GLUCONATE, SODIUM ACETATE, POTASSIUM CHLORIDE, MAGNESIUM CHLORIDE, SODIUM PHOSPHATE, DIBASIC, AND POTASSIUM PHOSPHATE: .53; .5; .37; .037; .03; .012; .00082 INJECTION, SOLUTION INTRAVENOUS at 11:09

## 2023-09-13 RX ADMIN — Medication 20 MG: at 09:09

## 2023-09-13 RX ADMIN — PROPOFOL 110 MG: 10 INJECTION, EMULSION INTRAVENOUS at 08:09

## 2023-09-13 RX ADMIN — FENTANYL CITRATE 100 MCG: 50 INJECTION, SOLUTION INTRAMUSCULAR; INTRAVENOUS at 08:09

## 2023-09-13 RX ADMIN — EPHEDRINE SULFATE 15 MG: 50 INJECTION INTRAVENOUS at 09:09

## 2023-09-13 NOTE — DISCHARGE SUMMARY
[facility] - Surgery (2nd Fl)  Discharge Note  Short Stay    Procedure(s) (LRB):  PARATHYROIDECTOMY,  SUBTOTAL (N/A)      OUTCOME: Patient tolerated treatment/procedure well without complication and is now ready for discharge.    DISPOSITION: Home or Self Care    FINAL DIAGNOSIS:  Primary hyperparathyroidism    FOLLOWUP: In clinic    DISCHARGE INSTRUCTIONS:    Discharge Procedure Orders   Diet Adult Regular     Notify your health care provider if you experience any of the following:  increased confusion or weakness     Notify your health care provider if you experience any of the following:  persistent dizziness, light-headedness, or visual disturbances     Notify your health care provider if you experience any of the following:  worsening rash     Notify your health care provider if you experience any of the following:  severe persistent headache     Notify your health care provider if you experience any of the following:  difficulty breathing or increased cough     Notify your health care provider if you experience any of the following:  redness, tenderness, or signs of infection (pain, swelling, redness, odor or green/yellow discharge around incision site)     Notify your health care provider if you experience any of the following:  severe uncontrolled pain     Notify your health care provider if you experience any of the following:  persistent nausea and vomiting or diarrhea     Notify your health care provider if you experience any of the following:  temperature >100.4     No dressing needed     Activity as tolerated        TIME SPENT ON DISCHARGE: 10 minutes    Markus Garvey MD  General Surgery PGY-4  09/13/2023

## 2023-09-13 NOTE — ANESTHESIA PROCEDURE NOTES
Arterial    Diagnosis: Hyperparathyroidism    Patient location during procedure: done in OR    Staffing  Authorizing Provider: Tian Stern MD  Performing Provider: David Nolan MD    Staffing  Performed by: David Nolan MD  Authorized by: Tian Stern MD    Anesthesiologist was present at the time of the procedure.  Arterial  Skin Prep: chlorhexidine gluconate  Local Infiltration: none  Orientation: right  Location: radial    Catheter placement by Anatomical landmarks. Heme positive aspiration all ports.Insertion Attempts: 1  Assessment  Dressing: secured with tape and tegaderm  Patient: Tolerated well

## 2023-09-13 NOTE — PROGRESS NOTES
Leena WISDOM with anesthesia called due to patient's complaint of back pain, chest pain, and right jaw pain. Patient reports neck incision of being sore, but main complaint is other symptoms. STAT EKG ordered at this time. Vital signs remain stable. WCTM.

## 2023-09-13 NOTE — ANESTHESIA POSTPROCEDURE EVALUATION
Anesthesia Post Evaluation    Patient: Anna Alas    Procedure(s) Performed: Procedure(s) (LRB):  PARATHYROIDECTOMY,  SUBTOTAL (N/A)    Final Anesthesia Type: general      Patient location during evaluation: PACU  Patient participation: Yes- Able to Participate  Level of consciousness: awake and alert  Post-procedure vital signs: reviewed and stable  Pain management: adequate  Airway patency: patent  JEFFERY mitigation strategies: Multimodal analgesia  PONV status at discharge: No PONV  Anesthetic complications: no      Cardiovascular status: blood pressure returned to baseline and hemodynamically stable  Respiratory status: unassisted  Hydration status: euvolemic  Follow-up not needed.          Vitals Value Taken Time   /82 09/13/23 1402   Temp 36.5 °C (97.7 °F) 09/13/23 1249   Pulse 77 09/13/23 1403   Resp 10 09/13/23 1403   SpO2 94 % 09/13/23 1403   Vitals shown include unvalidated device data.      No case tracking events are documented in the log.      Pain/Shahrzad Score: Pain Rating Prior to Med Admin: 6 (9/13/2023  1:57 PM)  Shahrzad Score: 9 (9/13/2023  1:00 PM)

## 2023-09-13 NOTE — ANESTHESIA PROCEDURE NOTES
Intubation    Date/Time: 9/13/2023 8:26 AM    Performed by: David Nolan MD  Authorized by: Tian Stern MD    Intubation:     Induction:  Rapid sequence induction    Intubated:  Postinduction    Mask Ventilation:  Not attempted    Attempts:  1    Attempted By:  Resident anesthesiologist    Method of Intubation:  Video laryngoscopy    Blade:  Hernandez 3    Laryngeal View Grade: Grade I - full view of cords      Difficult Airway Encountered?: No      Complications:  None    Airway Device:  EMG ETT (NIMS)    Airway Device Size:  7.0    Style/Cuff Inflation:  Cuffed    Inflation Amount (mL):  10    Tube secured:  23    Secured at:  The teeth    Placement Verified By:  Capnometry    Complicating Factors:  None    Findings Post-Intubation:  BS equal bilateral

## 2023-09-13 NOTE — TRANSFER OF CARE
"Anesthesia Transfer of Care Note    Patient: Anna Alas    Procedure(s) Performed: Procedure(s) (LRB):  PARATHYROIDECTOMY,  SUBTOTAL (N/A)    Patient location: PACU    Anesthesia Type: general    Transport from OR: Transported from OR on 6-10 L/min O2 by face mask with adequate spontaneous ventilation    Post pain: adequate analgesia    Post assessment: no apparent anesthetic complications    Post vital signs: stable    Level of consciousness: awake and alert    Nausea/Vomiting: no nausea/vomiting    Complications: none    Transfer of care protocol was followed      Last vitals:   Visit Vitals  BP (!) 157/80 (BP Location: Right arm, Patient Position: Lying)   Pulse (!) 18   Temp 36.9 °C (98.4 °F) (Temporal)   Resp 18   Ht 4' 11" (1.499 m)   Wt 63 kg (139 lb)   LMP 03/31/2010   SpO2 99%   Breastfeeding No   BMI 28.07 kg/m²     "

## 2023-09-13 NOTE — PLAN OF CARE
Discharge instructions reviewed with pt and pt's daughter at bedside. Verbalized understanding. Packet given. Meds delivered to bedside.

## 2023-09-13 NOTE — NURSING TRANSFER
Nursing Transfer Note      9/13/2023   2:20 PM    Nurse giving handoff:DANIEL Lopez RN   Nurse receiving handoff:ROSALBA Perea RN     Reason patient is being transferred: post procedure     Transfer To: [facility] slot 28    Transfer via stretcher    Transported by RN     Transfer Vital Signs:  Blood Pressure:138/82  Heart Rate:81  O2:95% on RA   Respirations:22    Medicines sent: yes     Any special needs or follow-up needed: routine     Patient belongings transferred with patient: Yes    Chart send with patient: Yes    Notified: daughter    Patient reassessed at: 1400 on 9/13

## 2023-09-13 NOTE — PROGRESS NOTES
MD, Canipe at bedside to assess patient after reports of chest, jaw, and back pain. MD believes all this pain is due to positioning during the surgery. Patient reporting decreased pain at this time. MD okay with patient going to [facility] at this time. VSS. WCTM.

## 2023-09-13 NOTE — PATIENT INSTRUCTIONS
Post-Operative Instructions: Parathyroidectomy    MEDICATIONS  You are being discharged home on the following medications:    Calcium Supplementation  Calcium Carbonate (Tums Ultra): 2 tablets, 2 times a day with meals  One Tums Ultra tablet has 1000 mg calcium carbonate which is equal to 400 mg elemental calcium.  *If you notice any numbness or tingling of the face, hands, or feet, take 2 extra tablets of Tums.  If symptoms do not subside within a half-hour, please call your surgeon's office.  Do NOT take your Tums in the morning before your labs are drawn.     Vitamin D  Please take 2000 IU daily of vitamin D3 (cholecalciferol) which can be purchased over the counter from your local pharmacy or online.     Pain medication  Narcotic medication (Oxycodone 5 mg) has been prescribed by your doctor for post-operative pain.   If you prefer, you may take Tylenol (acetaminophen).  Cepacol lozenges: Use as needed for sore throat.  These can be purchased at the pharmacy.    You may resume taking your normal medications, with the EXCEPTION of the following:  Please check with your surgeon and internist/cardiologist for specific instructions about when you should re-start:  Apixaban (Eliquis), Clopidogrel (Plavix), Dabigatran (Pradaxa), Dipyridamole (Aggrenox), Rivaroxaban (Xarelto), Warfarin (Coumadin), or any other type of blood thinner you may be taking.  Aspirin & anti-inflammatories (i.e. Goody's, Excedrin, ibuprofen, Advil, Aleve): May begin 72 hours after surgery.  Vitamins, minerals, and herbal supplements: Please wait 1 week after surgery to restart these medications    WOUND CARE  Please use your ice pack for 30 minutes on / 30 minutes off for at least the first 7 days.  You will be discharged from the hospital with your incision covered by skin glue that will stay on until your postoperative appointment.  It is normal to develop a lump under the incision, this is expected and is related to the healing process.  The  lump will gradually go away with time.  You may shower when you return home after the surgery.  No bathing or swimming (do not submerge in water) until cleared by your surgeon.  Please notify your physician if your incision is red, warm/hot, if you have an increase in swelling, any new drainage, or if you have a fever >101.5 F.  Please call 911 or proceed to the Emergency Room if you have difficulty breathing.    ACTIVITIES  You may resume normal activities, depending on your energy level.  Please refrain from driving for at least 3 days, or until you have complete mobility of your neck.  Do not drive if you are taking narcotic pain medication.  Please refrain from heavy lifting (10 lbs.) for 10 days.    If you have any questions or concerns, please call the surgeon's office at [phone].      Future Appointments   Date Time Provider Department Center   9/22/2023  9:00 AM [facility] LABORATORY [facility] LAB [facility]   9/25/2023  4:30 PM [facility]   10/20/2023  8:30 AM Reginaldo Monaco DO [facility] ENDOCRI [facility]   4/1/2024  9:15 AM ECHO, M[facility] HEENA [facility]jo   4/8/2024 12:00 PM Felix Olivera MD [facility] CARDIO [facility]      Do NOT take your calcium supplements in the morning before your lab appointment.

## 2023-09-13 NOTE — OP NOTE
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  Preoperative imaging localizes to a likely a left inferior parathyroid adenoma.  The patient now presents for a parathyroidectomy with intraoperative PTH monitoring.     Surgeon(s) and Role:     * Smitha Brown MD - Primary     * Markus Garvey MD - Resident - Assisting (PGY4)    Pre-Operative Diagnosis: Primary hyperparathyroidism [E21.0]    Post-Operative Diagnosis: Primary hyperparathyroidism [E21.0]    Anesthesia: General    Procedures:   Subtotal parathyroidectomy, bilateral exploration, excision of left inferior parathyroid adenoma, right superior and inferior parathyroid glands and half of left superior parathyroid gland.  Intraoperative monitoring and interpretation of bilateral cranial nerves (vagus and recurrent laryngeal nerves) using the [employer] system  Intraoperative PTH level sampling and interpretation     Intraoperative Findings:   Left inferior parathyroid adenoma was identified.  Confirmed parathyroid tissue with frozen section, reported as nodular.  Gland excised.  Inappropriate change in PTH level after left inferior parathyroid adenoma excised.  Left superior parathyroid gland identified, half of gland removed, part sent for biopsy and confirmed parathyroid tissue with frozen section.  Remnant remains in situ and marked with a small clip.  Right superior parathyroid gland identified, initially biopsied, confirmed nodular parathyroid tissue, ultimately gland excised.  Right inferior parathyroid gland identified, initially biopsied, confirmed nodular parathyroid tissue, remaining portion of gland excised but remaining portion of gland was too small for permanent sectioning.  Appropriate decrease in PTH following  excision 3.5 glands.  The bilateral recurrent laryngeal nerves and vagus nerves were identified and preserved.  Function was verified using the nerve monitoring system.    Description of the Procedure:  The patient was seen in the Holding Room. The risks, benefits, complications, treatment options, and expected outcomes were discussed with the patient. The possibilities of reaction to medication, pulmonary aspiration, bleeding, infection, finding a normal parathyroid tissue, inability to identify all explored parathyroid glands, recurrent or persistent hyperparathyroidism, temporary or permanent hypocalcemia, temporary or permanent hypoparathyroidism, superior laryngeal or recurrent laryngeal nerve damage, the need for additional procedures, failure to diagnose a condition, creating a complication requiring transfusion or operation, stroke, death and imponderables. The patient concurred with the proposed plan and agreed to proceed despite the risks, giving informed consent.  The site of surgery was confirmed and marked. The patient was taken to Operating Room, identified as Anna Alas and the procedure verified as parathyroidectomy with intraoperative PTH monitoring.     Induction of general anesthesia was performed and the anesthesia team placed all appropriate lines.  A baseline PTH level was drawn and resulted as 103.4.  The patient was positioned supine with a shoulder roll, the neck was extended, the arms were padded and tucked and the bed was positioned into modified beachchair position.  The surgical field was prepped and draped in standard sterile fashion.  A timeout was performed.  A 4 cm transverse cervical incision was created below the cricoid cartilage within a natural skin fold. Dissection was carried down through the platysma layer. Once this was completed, sub-platysmal flaps were raised superiorly to the thyroid cartilage and inferiorly to the sternal notch. The strap muscles were identified  and divided at the midline. Sharp and blunt dissection were used to mobilize the left thyroid lobe in a medial direction.  A vagus signal was confirmed with the nerve monitoring system.  The left inferior parathyroid gland was identified just lateral and inferior to the inferior pole of the thyroid lobe.  This was carefully dissected and the polar vessel was ligated with a tiny clip.      A time zero PTH level was drawn at the time of left inferior specimen extraction, with subsequent levels at 10, 15 and 20 minutes post-excision.  These resulted at time 0 = 191.0, 10 min = 97.6, 15 min = 84.5, and 20 min = 80.6.  Parathyroid tissue was confirmed with frozen section and reported as nodular parathyroid tissue.    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  With further exploration, the left superior parathyroid gland was identified and did not appear particularly abnormal.  The left superior parathyroid gland was biopsied, taking about half the gland and the remnant was marked with a clip and preserved in situ.  Part of the biopsied gland was sent for frozen section and confirmed to be parathyroid tissue. Vagus nerve signal was confirmed with the nerve monitoring system prior to proceeding to the contralateral side as the PTH levels did not appropriately drop.    Attention was then turned to the right side to complete a four gland exploration.  The superior parathyroid gland was identified just deep and lateral to the recurrent laryngeal nerve in the tracheoesophageal groove.  The right superior parathyroid gland appeared fairly normal in size and color and was biopsied, confirming parathyroid tissue.  The right inferior parathyroid gland was challenging to find but was ultimately identified medial and anterior to the recurrent laryngeal nerve under the  inferior thyroid artery .  The right inferior parathyroid gland also did not appear to be particularly abnormal, however a biopsy was sent and confirmed to be nodular parathyroid tissue.     At this point a 60 minute post excision of the left inferior adenoma resulted as 90.9 and still was an inappropriate change.  Thus, the decision was made to complete a subtotal three and a half gland parathyroidectomy. The right superior parathyroid gland was excised and the right inferior parathyroid gland remnant was friable and there was only a miniscule remnant that was not enough tissue to send for permanent specimen.  The left superior parathyroid remnant was inspected and appeared viable.    A PTH level was drawn about 20 minutes after the subtotal parathyroidectomy to ensure the PTH level had not dropped too low, and this resulted as 62.3.      The surgical bed was irrigated and inspected carefully. Multiple Valsalva maneuvers were performed at 30-40 cm of water and additional hemostasis was achieved as necessary with focal application of bipolar cautery. This was augmented with Fibrillar which was placed in the surgical bed.  Recurrent laryngeal and vagus nerve function was verified bilaterally using the nerve monitor prior to closure.  0.25% Marcaine was injected into the subcutaneous tissue of the incision for post-op analgesia. The strap muscles were then closed with interrupted 3-0 Vicryl suture.  The platysma was closed with interrupted 3-0 Vicryl suture, and the skin incision was closed with a 6-0 Vicryl subcuticular knot-less closure. Sterile skin glue was applied to the incision.    A debrief was performed.  Specimens were confirmed.  Instrument, sponge, and needle counts were reported correct prior to closure and at the conclusion of the case.     Complications: No    Estimated Blood Loss (EBL): less than 50 mL           Drains: None    Implants: None    Specimens:   Specimen (24h ago, onward)       Start      Ordered    09/13/23 1232  Specimen to Pathology, Surgery General Surgery  Once        Comments: Pre-op Diagnosis: Primary hyperparathyroidism [E21.0]Procedure(s):PARATHYROIDECTOMY,  SUBTOTAL Number of specimens: 8Name of specimens: 1. Left inferior parathyroid biopsy - frozen 2. Question left superior parathyroid biopsy - frozen3. Question Left  superior parathyroid biopsy #2- frozen4. Right superior  parathyroid biopsy - frozen5. Question right inferior parathyroid biopsy-frozen6. Left inferior parathyroid adenoma  - perm7. Left  superior parathyroid biopsy - perm8. Right superior parathyroid adenoma - perm     References:    Click here for ordering Quick Tip   Question Answer Comment   Procedure Type: General Surgery    Specimen Class: Complex case/Special    Which provider would you like to cc? HOLLI CLEMENS    Release to patient Immediate        09/13/23 1232                           Condition: stable    Disposition: PACU - hemodynamically stable.    Attestation: I was present and scrubbed for the entire procedure.

## 2023-09-13 NOTE — H&P
Endocrine Surgery History & Physical     REFERRING PROVIDER: Smitha Brown MD    REASON FOR VISIT: Hyperparathyroidism    HPI: Anna Alas is a 68 y.o. female patient with a history notable for prolactinoma, GERD, ADHD, HTN, h/o nephrolithiasis who presents in consultation for primary hyperparathyroidism.  Suspicion for hyperparathyroidism was raised on routine laboratory testing to have elevated calcium levels.      Details of the workup are as follows:     Recent laboratory studies:  Hypercalcemia noted since 2016  Max calcium elevation to 10.6 mg/dL  Parathyroid hormone levels normal with hypercalcemia or high normal calcium  Most recent PTH level was 120.7 with a corresponding calcium of 10.6, albumin 4.1  Phosphorus: 2.6  Vitamin D: 31 in 2020  24 hour urine calcium: 112 mg/24 hours, Benign Familial Hypocalciuric Hypercalcemia unlikely  Calcitonin 3/2023: <2.0  Gastrin 4/2023 (while on PPI): 157  Prolactin 4/2023: 36.7, elevated    Medications:  Vitamin D supplementation: none  Lithium, thiazide diuretics: none  Calcium supplements or calcimimetic: none  Bromocriptine    Symptoms:   The patient reports the following: []musculoskeletal pain, []fractures, [x]Nephrolithiasis (lithotripsy, [x]GERD, []peptic ulcer disease, []abdominal pain, []polydipsia, []polyuria, []Pruritis  Hair loss  The patient reports the following neurocognitive symptoms: []brain fog, []concentration difficulties, []fatigue, [x]forgetfulness, []mood/psychiatric disturbances  The patient denies dysphagia, globus sensation, compression symptoms, or anterior neck pain.  The patient endorses hoarseness (raspy), voice changes or increased need to clear the throat.  Bone mineral density: [x]Osteoporosis []Osteopenia []Normal bone density.  Last DEXA 2023 with T-score -3 of femur    Surgical risk factors:  Risk of concurrent thyroid disease: low; TSH WNL, US without concerning nodules   Ultrasound of the thyroid 5/23/2023   History of neck  radiation: none  Prior neck surgery: none  Cardiovascular risk: low, no recent echocardiogram  Antiplatelet therapy and anticoagulation: aspirin 81, fish oil    Family history:  No family history of endocrinopathies or endocrine cancers including pituitary tumors, pancreatic neuroendocrine tumors, medullary thyroid cancer or pheochromocytoma/paraganglioma.     Localization studies done prior to this visit:  Ultrasound: 5/2023 - suggest possible left inferior parathyroid adenoma  Nuclear Medicine Parathyroid Scan: 5/23/2023 - suggests left inferior parathyroid adenoma   4D-CT Parathyroid scan: Not obtained    LABORATORY STUDIES:  I personally and independently reviewed relevant lab test results, including the following:    Lab Results   Component Value Date    CALCIUM 10.3 08/25/2023    CALCIUM 10.4 04/18/2023    CALCIUM 10.6 (H) 03/24/2023    CALCIUM 10.5 (H) 12/13/2022    .7 (H) 04/18/2023    PTH 74.5 03/24/2023    ALBUMIN 3.7 08/25/2023    ALBUMIN 3.9 04/18/2023    ALBUMIN 4.1 03/24/2023    ALBUMIN 4.3 12/13/2022    PHOS 2.7 08/25/2023    PHOS 2.6 (L) 04/18/2023    [id number] 31 07/20/2020    TSH 1.912 04/18/2023       PAST MEDICAL HISTORY:  Patient Active Problem List   Diagnosis    Pituitary tumor    Lung mass    Encounter for long-term (current) use of medications    Moderate hypertension    Attention deficit hyperactivity disorder (ADHD), predominantly inattentive type    Herpes zoster without complication    Abnormally low high density lipoprotein (HDL) cholesterol with hypertriglyceridemia    Screen for colon cancer    Cystocele with prolapse    Gastroesophageal reflux disease with esophagitis without hemorrhage    History of kidney stones    Primary hyperparathyroidism         PAST SURGICAL HISTORY:  Past Surgical History:   Procedure Laterality Date    ANTERIOR VAGINAL REPAIR N/A 06/04/2021    Procedure: COLPORRHAPHY, ANTERIOR;  Surgeon: Robb Dooley MD;  Location: [facility] OR;  Service: Urology;   Laterality: N/A;    BREAST SURGERY      right breast abscess    COLONOSCOPY W/ POLYPECTOMY  07/06/2023    Dr Patrick    COSMETIC SURGERY      Breast Augmentation    ESOPHAGOGASTRODUODENOSCOPY N/A 09/22/2020    Procedure: EGD (ESOPHAGOGASTRODUODENOSCOPY);  Surgeon: Pillo Lopez Jr., MD;  Location: [facility] ENDO;  Service: Endoscopy;  Laterality: N/A;    EXTRACORPOREAL SHOCK WAVE LITHOTRIPSY      x 2    INSERTION OF MIDURETHRAL SLING N/A 06/04/2021    Procedure: SLING, MIDURETHRAL;  Surgeon: Robb Dooley MD;  Location: [facility] OR;  Service: Urology;  Laterality: N/A;    LITHOTRIPSY      twice in 2012, with stent , Once in 3/2015, other procedures at other facility    TUBAL LIGATION      VAGINAL DELIVERY      times 2        MEDICATIONS:  Current Facility-Administered Medications   Medication Dose Route Frequency Provider Last Rate Last Admin    BUPivacaine (PF) 0.25% (2.5 mg/ml) injection    PRN Smitha Brown MD   10 mL at 09/13/23 0750    sodium chloride 0.9% flush 10 mL  10 mL Intravenous PRN Markus Garvey MD           ALLERGIES:  Review of patient's allergies indicates:   Allergen Reactions    Fish containing products Hives     Catfish      Lisinopril      Cough    Shellfish containing products Hives     Just Shrimp (food)    Benadryl [diphenhydramine hcl] Other (See Comments)     Other reaction(s): Itching, pt can use up to 25mg without itching       SOCIAL HISTORY:  Social History     Socioeconomic History    Marital status: Single   Tobacco Use    Smoking status: Never    Smokeless tobacco: Never   Substance and Sexual Activity    Alcohol use: Yes     Alcohol/week: 0.0 standard drinks of alcohol     Comment: ocassionally < monthly    Drug use: No    Sexual activity: Not Currently     Social Determinants of Health     Financial Resource Strain: Low Risk  (4/22/2023)    Overall Financial Resource Strain (CARDIA)     Difficulty of Paying Living Expenses: Not very hard   Food Insecurity: No Food  Insecurity (4/22/2023)    Hunger Vital Sign     Worried About Running Out of Food in the Last Year: Never true     Ran Out of Food in the Last Year: Never true   Transportation Needs: No Transportation Needs (4/22/2023)    PRAPARE - Transportation     Lack of Transportation (Medical): No     Lack of Transportation (Non-Medical): No   Physical Activity: Insufficiently Active (4/22/2023)    Exercise Vital Sign     Days of Exercise per Week: 2 days     Minutes of Exercise per Session: 20 min   Stress: No Stress Concern Present (4/22/2023)    [employer] - Occupational Stress Questionnaire     Feeling of Stress : Not at all   Social Connections: Unknown (4/22/2023)    Social Connection and Isolation Panel [NHANES]     Frequency of Communication with Friends and Family: More than three times a week     Frequency of Social Gatherings with Friends and Family: More than three times a week     Active Member of Clubs or Organizations: Yes     Attends Club or Organization Meetings: More than 4 times per year     Marital Status:    Housing Stability: Unknown (4/22/2023)    Housing Stability Vital Sign     Unable to Pay for Housing in the Last Year: No     Unstable Housing in the Last Year: No         FAMILY HISTORY:  Family History   Problem Relation Age of Onset    Cancer Mother         Lung    Heart disease Mother     Heart attack Mother     Cancer Father         Lung    Heart disease Father     Heart attack Father     Cancer Paternal Uncle         Lung    Heart disease Maternal Grandfather     Cancer Paternal Grandmother         Breast     Heart disease Paternal Grandfather     No Known Problems Daughter     No Known Problems Son     Cancer Brother         Bladder    Cancer Brother         Prostate    Nephrolithiasis Neg Hx         REVIEW OF SYSTEMS:  A detailed review of systems has been reviewed with the patient, pertinent positives and negatives are presented in the note and is  "otherwise negative.    PHYSICAL EXAMINATION:  Vital Signs: BP (!) 157/80 (BP Location: Right arm, Patient Position: Lying)   Pulse (!) 18   Temp 98.4 °F (36.9 °C) (Temporal)   Resp 18   Ht 4' 11" (1.499 m)   Wt 63 kg (139 lb)   LMP 03/31/2010   SpO2 99%   Breastfeeding No   BMI 28.07 kg/m²     Constitutional: well-developed, well-nourished, no acute distress   HENT: no lid lag, no exophthalmos, no scleral icterus, moist mucous membranes, good dentition  Neck: supple, trachea in midline, thyroid is soft and moves well with swallowing, no palpable nodules, adequate neck extension  Heme/Lymph: no cervical or supraclavicular lymphadenopathy  Respiratory: normal respiratory effort, no wheezes or stridor  Cardiovascular: regular rate and rhythm  Extremities: no edema  Skin: warm and dry, no rashes  Neurologic: no gross resting tremor of outstretched hands, voice adequate  Vascular: radial pulses palpable bilaterally  Psychiatric: affect normal    IMAGING STUDIES:  I personally and independently reviewed, visualized and interpreted the images of the below listed radiology studies (including US and NM scan) and my findings are notable for possible left inferior adenoma.  Reports below for reference.      US Soft Tissue Head Neck Thyroid 05/23/2023    Narrative  EXAMINATION:  US SOFT TISSUE HEAD NECK THYROID    CLINICAL HISTORY:  Primary hyperparathyroidism    TECHNIQUE:  Ultrasound of the thyroid and cervical lymph nodes was performed.    COMPARISON:  None.    FINDINGS:  The right lobe of the thyroid gland measures 4.1 x 1.2 x 1.2 cm with a volume of 3.1 cc.  The left lobe of the thyroid gland measures 3.1 x 0.9 x 1.0 cm with a volume of 1.6 cc.  The isthmus measures 0.2 cm AP.  Total volume is 4.7 cc.    3 mm hypoechoic nodule in right lobe.  3 mm hypoechoic nodule left lobe.  No nodules meeting ultrasound criteria for recommendation for aspiration    Multiple prominent lymph nodes.    R 4; 1.3 x 0.6 x 0.8 cm  R2: " 1 x 0.8 x 0.4 cm  L3: 2.4 x 1.1 x 0.4 cm.  L3: 0.4 x 1.6 x 0.8 cm  L3: 0.6 x 0.5 x 1.2 cm  L3: 0.5 x 0.6 x 1.0 cm    Inferior to the left lobe of the thyroid gland 1.3 by 0.8 x 0.6 cm hypoechoic structure possibly lymph node but in the region where parathyroid suggested on available images from parathyroid imaging study 05/23/2023.  Please refer to that report.  It has internal vascular flow, is hypoechoic without evident echogenic hilum seen.    Impression  Small thyroid gland with no nodules meeting ultrasound criteria for recommendation for FNA    Mildly prominent cervical lymph stone bilaterally all with smooth cortex.    Hypoechoic structure inferior to the left lobe of the thyroid gland 1.3 x 0.6 x 0.8 cm may be lymph node but is thought more suggestive of parathyroid adenoma.    Electronically signed by: Yoana Shore MD  Date:    05/23/2023  Time:    14:40      DXA Bone Density Appendicular Skeleton 04/19/2023    Narrative  EXAMINATION:  DEXA BONE DENSITY APPENDICULAR SKELETON    CLINICAL HISTORY:  Benign neoplasm of pituitary gland    TECHNIQUE:  DXA scanning was performed over the left forearm.  Review of the images confirms satisfactory positioning and technique.    COMPARISON:  None    FINDINGS:  The left forearm bone mineral density is equal to 0.409 g/cm squared with a Z score of -3.0.  There has been  no significant change relative to the prior study.    Impression  Osteoporosis of the appendicular skeleton    A Z score less than -2 is considered below the expected range for age.  Osteoporosis can not be diagnosed in men under the age of 50 or premenopausal women on the basis of BMD alone.      Electronically signed by: Jayant Feliciano MD  Date:    04/19/2023  Time:    13:39      DXA Bone Density Axial Skeleton 1 or more sites 04/19/2023    Narrative  EXAMINATION:  DXA BONE DENSITY AXIAL SKELETON 1 OR MORE SITES    CLINICAL HISTORY:  Benign neoplasm of pituitary gland    TECHNIQUE:  DXA scanning was  performed over the left hip and lumbar spine.  Review of the images confirms satisfactory positioning and technique.    COMPARISON:  None    FINDINGS:  The L1 to L4 vertebral bone mineral density is equal to 0.704 g/cm squared with a T score of -3.1.    The left femoral neck bone mineral density is equal to 0.570 g/cm squared with a T score of -2.5.    There is a 13% risk of a major osteoporotic fracture and a 2.7% risk of hip fracture in the next 10 years (FRAX).    Impression  Osteoporosis of the left hip, osteoporosis of the lumbar spine    NM Parathyroid Scan Planar 05/23/2023    Narrative  EXAMINATION:  NM PARATHYROID SCAN PLANAR    CLINICAL HISTORY:  Primary hyperparathyroidism    TECHNIQUE:  Following IV injection of 19.7 mCi technetium 99 M sestamibi, imaging of the neck region was performed at 10 minutes and at 02:00 hours.    COMPARISON:  Correlation is made with the neck ultrasound dated 05/23/2023.    FINDINGS:  There is an oval-shaped nodular focus of persistent abnormally increased radiotracer accumulation just inferior to the left thyroid lobe which persists on the 2 hour delayed images and corresponds to an oval-shaped 1.3 x 0.8 x 0.6 cm extrathyroidal nodule on the corresponding neck ultrasound.  The finding is most consistent with a parathyroid adenoma.    Impression  Nodular focus of persistent increased radiotracer accumulation inferior to the left lobe of the thyroid corresponding to an oval-shaped nodule on ultrasound.  The finding is most consistent with a parathyroid adenoma.      Electronically signed by: Lawson Killian MD  Date:    05/23/2023  Time:    14:54        IMPRESSION:  I had the pleasure of seeing Ms. Alas in Endocrine Surgical consultation regarding the biochemical diagnosis of primary hyperparathyroidism.     We discussed that hyperparathyroidism can compromise bone and cardiovascular health. In addition to classic symptoms such as kidney stones and bone loss,  hyperparathyroidism can be associated with multiple symptoms including fatigue, depression, memory loss, pruritis, polyuria, nocturia, constipation, polydipsia, and musculoskeletal aches and pains. Hyperparathyroidism can also worsen hypertension.  I discussed the implications of non-operative observation as well as the standard of care surgical treatment of primary hyperparathyroidism and my recommendation for parathyroidectomy.      We extensively discussed the pros and cons for surgical intervention, in this case parathyroidectomy with intraoperative parathyroid hormone monitoring.  We discussed that in the majority of cases, a single adenoma is the culprit gland. However, multigland disease is possible and multigland disease has a lower likelihood of radiographic localization.  Parathyroidectomy for single gland disease is a same day surgery while four-gland parathyroid exploration may require an overnight stay. We discussed that in all cases, parathyroidectomy has an attendant risk of postoperative hypocalcemia which can be mitigated with calcium and vitamin D supplementation. Other possible complications associated with this may include, but may not be restricted to hoarseness, recurrent laryngeal nerve injury - temporary or permanent, superior laryngeal nerve injury - temporary or permanent, hypocalcemia and hypoparathyroidism - temporary or permanent, neck hematoma, bleeding, infection, scarring, wound healing problems and possible death. We discussed that in rare cases, parathyroid exploration may be negative. Recurrent and persistent disease are also possible.      All questions were answered and the patient expressed understanding of all the risks, benefits and alternatives, and agreed to proceed with the plan despite the risks.  Surgical consent was signed and witnessed.        Problem List Items Addressed This Visit          Endocrine    Primary hyperparathyroidism       Asymptomatic primary  hyperparathyroidism and meets criteria for parathyroid surgery based on osteoporosis, nephrolithiasis.  Localization studies suggest possible left inferior adenoma .     - Follow up genetic testing for MEN1, if positive would alter reoperative surgical plan  - Follow up GI work up, GI referral for gastric emptying study   - OR for parathyroidectomy with intraoperative PTH monitoring  - Start vitamin D3 2000 IU daily   - The patient has been advised to stay hydrated and avoid the use of calcium supplements       Smitha Brown MD  Staff Surgeon  Endocrine Surgery  09/13/2023

## 2023-09-15 ENCOUNTER — TELEPHONE (OUTPATIENT)
Dept: PLASTIC SURGERY | Facility: CLINIC | Age: 68
End: 2023-09-15
Payer: COMMERCIAL

## 2023-09-15 NOTE — TELEPHONE ENCOUNTER
In basket message received in error.  Notification to MA of error. However contact call made to Follow up with Dr Brown office and left voice message with answering staff to contact patient RE: numbness reported.    Answering staff reports Dr Brown Nurse is abdon Leary. - in basket message sent to nurse as well.    Contact with patient for status/concerns. Pt reports she has not received a returned call yet from Dr Brown office. Pt reports she is feeling better.  Notified patient that her message was forwarded to wrong dept in error and I am working on get her message sent to Dr Brown and that office should contact her shortly.  Pt voiced ok.    Contact with dept manager, ROCHELLE Tubbs as Dr Brown nurse not in office today.  ROCHELLE Tubbs forwarded patient message to Dr Brown.  Confirmed receipt of message received by Dr Brown.

## 2023-09-18 NOTE — TELEPHONE ENCOUNTER
S/w send out lab at main campus, they called the external send out lab again to check status.  The lab states the sample was rec'd 8/4 but was not approved by insurance until 8/31 which is when testing began.  Takes about 4 weeks from that point to result.  Pt advised.

## 2023-09-20 LAB
FINAL PATHOLOGIC DIAGNOSIS: NORMAL
FROZEN SECTION DIAGNOSIS: NORMAL
FROZEN SECTION FOOTNOTE: NORMAL
GROSS: NORMAL
Lab: NORMAL

## 2023-09-21 ENCOUNTER — PATIENT MESSAGE (OUTPATIENT)
Dept: SURGERY | Facility: CLINIC | Age: 68
End: 2023-09-21
Payer: COMMERCIAL

## 2023-09-21 NOTE — PROGRESS NOTES
Postoperative Endocrine Surgery Clinic Note    Reason for visit / Chief complaint: Postoperative evaluation  Endocrinologist: Reginaldo Monaco DO    Procedure:  Parathyroidectomy,  Subtotal  Procedure Date: 9/13/2023    Subjective:     Anna Alas returns today for postoperative evaluation, she is approximately 2 weeks post op.    Procedure:   Subtotal parathyroidectomy, bilateral exploration, excision of left inferior parathyroid adenoma, right superior and inferior parathyroid glands and half of left superior parathyroid gland.  Intraoperative monitoring and interpretation of bilateral cranial nerves (vagus and recurrent laryngeal nerves) using the [employer] system  Intraoperative PTH level sampling and interpretation     Operative findings:   Left inferior parathyroid adenoma was identified.  Confirmed parathyroid tissue with frozen section, reported as nodular.  Gland excised.  Inappropriate change in PTH level after left inferior parathyroid adenoma excised.  Left superior parathyroid gland identified, half of gland removed, part sent for biopsy and confirmed parathyroid tissue with frozen section.  Remnant remains in situ and marked with a small clip.  Right superior parathyroid gland identified, initially biopsied, confirmed nodular parathyroid tissue, ultimately gland excised.  Right inferior parathyroid gland identified, initially biopsied, confirmed nodular parathyroid tissue, remaining portion of gland excised but remaining portion of gland was too small for permanent sectioning.  Appropriate decrease in PTH following excision 3.5 glands.  The bilateral recurrent laryngeal nerves and vagus nerves were identified and preserved.  Function was verified using the nerve monitoring system.    She has had an uncomplicated postoperative course. She denies signs or symptoms of hypocalcemia. She is currently taking calcium supplements, but did not take any prior to the lab draw.  Her phonation is at baseline.   Pain is well controlled.    Current Outpatient Medications   Medication Sig Dispense Refill    aspirin (ECOTRIN) 81 MG EC tablet Take 81 mg by mouth once daily.      bromocriptine (PARLODEL) 2.5 mg Tab Tab 1 po M/W/F, tabs 2 po S/S/T/T. (Patient taking differently: 5 mg once daily.) 135 tablet 4    cetirizine (ZYRTEC) 10 MG tablet Take 10 mg by mouth daily as needed. 1 Tablet Oral Every day.  as directed      coenzyme Q10 (CO Q-10) 100 mg capsule Take 400 mg by mouth once daily.       dextroamphetamine-amphetamine (ADDERALL) 20 mg tablet Take 1 tablet by mouth 2 (two) times a day. 60 tablet 0    [START ON 10/11/2023] dextroamphetamine-amphetamine (ADDERALL) 20 mg tablet Take 1 tablet by mouth 2 (two) times a day. 60 tablet 0    [START ON 11/10/2023] dextroamphetamine-amphetamine (ADDERALL) 20 mg tablet Take 1 tablet by mouth 2 (two) times a day. 60 tablet 0    dicyclomine (BENTYL) 20 mg tablet Take 20 mg by mouth 4 (four) times daily as needed.      losartan (COZAAR) 50 MG tablet Take 1 tablet (50 mg total) by mouth once daily. 90 tablet 4    omega-3 fatty acids 1,000 mg Cap Take 2 g by mouth once daily.       ondansetron (ZOFRAN-ODT) 4 MG TbDL Take 1 tablet (4 mg total) by mouth every 6 (six) hours as needed. 10 tablet 0    pantoprazole (PROTONIX) 40 MG tablet Take 1 tablet (40 mg total) by mouth once daily. (Patient taking differently: Take 40 mg by mouth 2 (two) times daily.) 90 tablet 4    pravastatin (PRAVACHOL) 40 MG tablet Take 1 tablet (40 mg total) by mouth every evening. 90 tablet 4     No current facility-administered medications for this visit.     Review of patient's allergies indicates:   Allergen Reactions    Fish containing products Hives     Catfish      Lisinopril      Cough    Shellfish containing products Hives     Just Shrimp (food)    Benadryl [diphenhydramine hcl] Other (See Comments)     Other reaction(s): Itching, pt can use up to 25mg without itching       Review of Systems  Negative except  "as per HPI.     Objective:   /80   Ht 4' 11" (1.499 m)   Wt 64.6 kg (142 lb 6.7 oz)   LMP 03/31/2010   BMI 28.76 kg/m²     General: alert, well appearing, and in no distress  Neck: neck is flat, no erythema or edema  Incision: well approximated, healing well  Neurological: phonation is normal    Labs:  Lab Results   Component Value Date    CALCIUM 9.4 09/22/2023    ALBUMIN 3.9 09/22/2023    PHOS 3.1 09/22/2023    [id number] 31 07/20/2020       Pathology:  Final Pathologic Diagnosis   Date Value Ref Range Status   09/13/2023   Final    1. Parathyroid, left inferior, FS (biopsy):  Nodular parathyroid, 95% cellular  Weight: 0.13 grams    2. Soft tissue, left superior , FS (biopsy):  Benign thyroid tissue    3.  Parathyroid, left superior #2, SS (biopsy):  Parathyroid tissue present    4. Parathyroid, right superior, FS (biopsy):  Parathyroid tissue, 90% cellular    5. Parathyroid, right inferior, FS (biopsy):  Nodular parathyroid, 90% cellular    6. Parathyroid, left inferior (excision):    Nodular parathyroid, 95% cellular  Weight: 0.458 grams    7. Parathyroid, left superior (biopsy):  Parathyroid tissue present, 95% cellular    8. Parathyroid, right superior (excision):    Parathyroid tissue, 85% cellular    Weight: 0.0246 grams       Comment:     Interp By Sakshi Bean M.D., Signed on 09/20/2023 at 13:34         Assessment and Plan       Problem List Items Addressed This Visit          Endocrine    Primary hyperparathyroidism    Current Assessment & Plan     Primary hyperparathyroidism s/p subtotal parathyroidectomy (left superior remnant remains in situ) on 9/13/2023.  Doing well postoperatively.  Voice adequate.  No hypocalcemia symptoms.  MEN genetic testing pending.    - Discontinue calcium supplements  - Discontinue narcotics  - Reviewed pathology  - Incision care discussed, scar massage and routine scar care information provided  - Recommended daily dietary calcium intake equal to about 3735-6768 " mg  - Recommend 9551-7113 IU vitamin D3 supplementation daily, available over the counter  - Obtain labs in six months to document cure of primary hyperparathyroidism with RFP, PTH  - Established with Dr. Monaco, has osteoporosis          Smitha Brown MD  Staff Surgeon  Endocrine Surgery  9/25/23

## 2023-09-22 ENCOUNTER — LAB VISIT (OUTPATIENT)
Dept: LAB | Facility: HOSPITAL | Age: 68
End: 2023-09-22
Attending: STUDENT IN AN ORGANIZED HEALTH CARE EDUCATION/TRAINING PROGRAM
Payer: COMMERCIAL

## 2023-09-22 DIAGNOSIS — E21.0 PRIMARY HYPERPARATHYROIDISM: ICD-10-CM

## 2023-09-22 LAB
ALBUMIN SERPL BCP-MCNC: 3.9 G/DL (ref 3.5–5.2)
ANION GAP SERPL CALC-SCNC: 7 MMOL/L (ref 8–16)
BUN SERPL-MCNC: 19 MG/DL (ref 8–23)
CALCIUM SERPL-MCNC: 9.4 MG/DL (ref 8.7–10.5)
CHLORIDE SERPL-SCNC: 105 MMOL/L (ref 95–110)
CO2 SERPL-SCNC: 26 MMOL/L (ref 23–29)
CREAT SERPL-MCNC: 1 MG/DL (ref 0.5–1.4)
EST. GFR  (NO RACE VARIABLE): >60 ML/MIN/1.73 M^2
GLUCOSE SERPL-MCNC: 86 MG/DL (ref 70–110)
PHOSPHATE SERPL-MCNC: 3.1 MG/DL (ref 2.7–4.5)
POTASSIUM SERPL-SCNC: 4.2 MMOL/L (ref 3.5–5.1)
SODIUM SERPL-SCNC: 138 MMOL/L (ref 136–145)

## 2023-09-22 PROCEDURE — 36415 COLL VENOUS BLD VENIPUNCTURE: CPT | Mod: PN | Performed by: STUDENT IN AN ORGANIZED HEALTH CARE EDUCATION/TRAINING PROGRAM

## 2023-09-22 PROCEDURE — 80069 RENAL FUNCTION PANEL: CPT | Performed by: STUDENT IN AN ORGANIZED HEALTH CARE EDUCATION/TRAINING PROGRAM

## 2023-09-25 ENCOUNTER — OFFICE VISIT (OUTPATIENT)
Dept: SURGERY | Facility: CLINIC | Age: 68
End: 2023-09-25
Payer: COMMERCIAL

## 2023-09-25 VITALS
BODY MASS INDEX: 28.72 KG/M2 | HEIGHT: 59 IN | WEIGHT: 142.44 LBS | DIASTOLIC BLOOD PRESSURE: 80 MMHG | SYSTOLIC BLOOD PRESSURE: 125 MMHG

## 2023-09-25 DIAGNOSIS — E21.0 PRIMARY HYPERPARATHYROIDISM: ICD-10-CM

## 2023-09-25 PROCEDURE — 1126F AMNT PAIN NOTED NONE PRSNT: CPT | Mod: CPTII,S$GLB,, | Performed by: STUDENT IN AN ORGANIZED HEALTH CARE EDUCATION/TRAINING PROGRAM

## 2023-09-25 PROCEDURE — 1126F PR PAIN SEVERITY QUANTIFIED, NO PAIN PRESENT: ICD-10-PCS | Mod: CPTII,S$GLB,, | Performed by: STUDENT IN AN ORGANIZED HEALTH CARE EDUCATION/TRAINING PROGRAM

## 2023-09-25 PROCEDURE — 1159F MED LIST DOCD IN RCRD: CPT | Mod: CPTII,S$GLB,, | Performed by: STUDENT IN AN ORGANIZED HEALTH CARE EDUCATION/TRAINING PROGRAM

## 2023-09-25 PROCEDURE — 99024 PR POST-OP FOLLOW-UP VISIT: ICD-10-PCS | Mod: S$GLB,,, | Performed by: STUDENT IN AN ORGANIZED HEALTH CARE EDUCATION/TRAINING PROGRAM

## 2023-09-25 PROCEDURE — 3074F PR MOST RECENT SYSTOLIC BLOOD PRESSURE < 130 MM HG: ICD-10-PCS | Mod: CPTII,S$GLB,, | Performed by: STUDENT IN AN ORGANIZED HEALTH CARE EDUCATION/TRAINING PROGRAM

## 2023-09-25 PROCEDURE — 3079F DIAST BP 80-89 MM HG: CPT | Mod: CPTII,S$GLB,, | Performed by: STUDENT IN AN ORGANIZED HEALTH CARE EDUCATION/TRAINING PROGRAM

## 2023-09-25 PROCEDURE — 4010F PR ACE/ARB THEARPY RXD/TAKEN: ICD-10-PCS | Mod: CPTII,S$GLB,, | Performed by: STUDENT IN AN ORGANIZED HEALTH CARE EDUCATION/TRAINING PROGRAM

## 2023-09-25 PROCEDURE — 99999 PR PBB SHADOW E&M-EST. PATIENT-LVL III: CPT | Mod: PBBFAC,,, | Performed by: STUDENT IN AN ORGANIZED HEALTH CARE EDUCATION/TRAINING PROGRAM

## 2023-09-25 PROCEDURE — 3008F PR BODY MASS INDEX (BMI) DOCUMENTED: ICD-10-PCS | Mod: CPTII,S$GLB,, | Performed by: STUDENT IN AN ORGANIZED HEALTH CARE EDUCATION/TRAINING PROGRAM

## 2023-09-25 PROCEDURE — 1101F PT FALLS ASSESS-DOCD LE1/YR: CPT | Mod: CPTII,S$GLB,, | Performed by: STUDENT IN AN ORGANIZED HEALTH CARE EDUCATION/TRAINING PROGRAM

## 2023-09-25 PROCEDURE — 3074F SYST BP LT 130 MM HG: CPT | Mod: CPTII,S$GLB,, | Performed by: STUDENT IN AN ORGANIZED HEALTH CARE EDUCATION/TRAINING PROGRAM

## 2023-09-25 PROCEDURE — 3008F BODY MASS INDEX DOCD: CPT | Mod: CPTII,S$GLB,, | Performed by: STUDENT IN AN ORGANIZED HEALTH CARE EDUCATION/TRAINING PROGRAM

## 2023-09-25 PROCEDURE — 3079F PR MOST RECENT DIASTOLIC BLOOD PRESSURE 80-89 MM HG: ICD-10-PCS | Mod: CPTII,S$GLB,, | Performed by: STUDENT IN AN ORGANIZED HEALTH CARE EDUCATION/TRAINING PROGRAM

## 2023-09-25 PROCEDURE — 4010F ACE/ARB THERAPY RXD/TAKEN: CPT | Mod: CPTII,S$GLB,, | Performed by: STUDENT IN AN ORGANIZED HEALTH CARE EDUCATION/TRAINING PROGRAM

## 2023-09-25 PROCEDURE — 99999 PR PBB SHADOW E&M-EST. PATIENT-LVL III: ICD-10-PCS | Mod: PBBFAC,,, | Performed by: STUDENT IN AN ORGANIZED HEALTH CARE EDUCATION/TRAINING PROGRAM

## 2023-09-25 PROCEDURE — 1101F PR PT FALLS ASSESS DOC 0-1 FALLS W/OUT INJ PAST YR: ICD-10-PCS | Mod: CPTII,S$GLB,, | Performed by: STUDENT IN AN ORGANIZED HEALTH CARE EDUCATION/TRAINING PROGRAM

## 2023-09-25 PROCEDURE — 1159F PR MEDICATION LIST DOCUMENTED IN MEDICAL RECORD: ICD-10-PCS | Mod: CPTII,S$GLB,, | Performed by: STUDENT IN AN ORGANIZED HEALTH CARE EDUCATION/TRAINING PROGRAM

## 2023-09-25 PROCEDURE — 3288F FALL RISK ASSESSMENT DOCD: CPT | Mod: CPTII,S$GLB,, | Performed by: STUDENT IN AN ORGANIZED HEALTH CARE EDUCATION/TRAINING PROGRAM

## 2023-09-25 PROCEDURE — 3288F PR FALLS RISK ASSESSMENT DOCUMENTED: ICD-10-PCS | Mod: CPTII,S$GLB,, | Performed by: STUDENT IN AN ORGANIZED HEALTH CARE EDUCATION/TRAINING PROGRAM

## 2023-09-25 PROCEDURE — 99024 POSTOP FOLLOW-UP VISIT: CPT | Mod: S$GLB,,, | Performed by: STUDENT IN AN ORGANIZED HEALTH CARE EDUCATION/TRAINING PROGRAM

## 2023-09-25 NOTE — Clinical Note
JENNIFERI - did subtotal (3.5) gland excision and final path showed four gland hyperplasia.  Saw her in the office today for a post op visit and she's doing well.

## 2023-09-25 NOTE — ASSESSMENT & PLAN NOTE
Primary hyperparathyroidism s/p subtotal parathyroidectomy (left superior remnant remains in situ) on 9/13/2023.  Doing well postoperatively.  Voice adequate.  No hypocalcemia symptoms.  MEN genetic testing pending.    - Discontinue calcium supplements  - Discontinue narcotics  - Reviewed pathology  - Incision care discussed, scar massage and routine scar care information provided  - Recommended daily dietary calcium intake equal to about 5225-7254 mg  - Recommend 8076-1799 IU vitamin D3 supplementation daily, available over the counter  - Obtain labs in six months to document cure of primary hyperparathyroidism with RFP, PTH  - Established with Dr. Monaco, has osteoporosis

## 2023-09-28 ENCOUNTER — PATIENT MESSAGE (OUTPATIENT)
Dept: SURGERY | Facility: CLINIC | Age: 68
End: 2023-09-28
Payer: COMMERCIAL

## 2023-09-28 DIAGNOSIS — Z90.89 S/P PARATHYROIDECTOMY: Primary | ICD-10-CM

## 2023-09-28 DIAGNOSIS — Z98.890 S/P PARATHYROIDECTOMY: Primary | ICD-10-CM

## 2023-09-28 LAB
MISCELLANEOUS TEST NAME: NORMAL
REFERENCE LAB: NORMAL
SPECIMEN TYPE: NORMAL
TEST RESULT: NORMAL

## 2023-10-01 ENCOUNTER — PATIENT MESSAGE (OUTPATIENT)
Dept: ENDOCRINOLOGY | Facility: CLINIC | Age: 68
End: 2023-10-01
Payer: COMMERCIAL

## 2023-10-10 ENCOUNTER — PATIENT MESSAGE (OUTPATIENT)
Dept: SURGERY | Facility: CLINIC | Age: 68
End: 2023-10-10
Payer: COMMERCIAL

## 2023-10-10 ENCOUNTER — TELEPHONE (OUTPATIENT)
Dept: ENDOCRINOLOGY | Facility: CLINIC | Age: 68
End: 2023-10-10
Payer: COMMERCIAL

## 2023-10-10 DIAGNOSIS — E31.21 MEN 1 (MULTIPLE ENDOCRINE NEOPLASIA): Primary | ICD-10-CM

## 2023-10-13 NOTE — PROGRESS NOTES
"Cancer Genetics  Hereditary and High-Risk Clinic  Department of Hematology and Oncology  [facility]    Date of Service:  10/18/23  Visit Provider:  Nargis Mckeon, [facility], [facility]    Patient ID  Name: Anna Alas    : 1955    MRN: [id number]      Referring Provider  Reginaldo Monaco DO  [address] [place] [address]    Televisit Information  The patient location is: [place], [place].    The chief complaint leading to consultation is:  As below.    Visit type: audiovisual.    Face-to-face time with patient:  Approximately 65 minutes.    Approximately 150 minutes in total were spent on this encounter, which includes face-to-face time and non-face-to-face time preparing to see the patient (e.g., review of records and tests), obtaining and/or reviewing separately obtained history, documenting clinical information in the electronic or other health record, independently interpreting results (not separately reported) and communicating results to the patient/family/caregiver, or care coordination (not separately reported).  Each patient to whom he or she provides medical services by telemedicine is:  (1) informed of the relationship between the physician and patient and the respective role of any other health care provider with respect to management of the patient; and (2) notified that he or she may decline to receive medical services by telemedicine and may withdraw from such care at any time.    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 She is being treated by Dr. Monaco in Endocrinology and should pursue additional screening for MEN1-related tumors as detailed below in the discussion section. She should also follow-up with Cancer Genetics every ~2 years for " "updated related to MEN1 management. Her children have a 50% chance to have inherited this mutation, and other relatives may be at risk if it is not a de kaye mutation (but if it is truly mosaic then it is almost certainly de kaye).    Anna separately meets NCCN criteria for genetic testing based on her maternal and paternal family histories of breast cancer. Unfortunately, hereditary breast cancer genes were not included in the genetic testing previously ordered. Additionally, as the MEN1 mutation was listed as mosaic (but VAF could not be given since Silvia sequencing was used) and [facility] does not provide free familial mutation testing to relatives, Anna is interested in repeating genetic testing in the context of a panel through a different laboratory. She will follow-up with me for an in-person appointment in ~2 weeks to discuss this further.    FOCUSED PERSONAL HISTORY     Chief Complaint: Genetic Evaluation (MEN1)    History of Present Illness (HPI):  Anna Alas ("Anna"), 68 y.o., assigned female sex at birth, is new to the [facility] of [facility] and to me.  She was referred by Dr. Reginaldo Monaco with genetic counseling given her recent MEN1+ genetic test results. She has a personal history of hyperparathyroidism and a pituitary tumor (prolactinoma), but unclear if she has any other MEN1-related tumors.    Prolactinoma  Anna reported that a pituitary tumor was originally found 42 years after brain imaging due to amenorrhea and galactorrhea that led to a referral to endocrinologist.    -MRI Pituitary on 5/1/23:  There is no acute intracranial abnormality.  There is stable nonspecific white matter change but there is no intracranial hemorrhage, parenchymal mass, acute infarction or abnormal enhancement in the brain.  There are more subtle findings involving the left side of the pituitary gland which remains mildly prominent when compared to the right.  There is a smaller more " ill-defined 5 x 5 mm region of T2 hyperintense signal in the left side of the gland which may represent residual but slightly smaller adenoma.  These findings may reflect some degree of treatment response.  Pituitary mass is certainly not any larger when compared to the prior studies.    -MRI Brain on 4/26/19:  Stable size of 6.3 x 7.5 cm left pituitary microadenoma.  Stable chronic microvascular ischemic change.    Primary Hyperparathyroidism  Anna reported a long history of symptoms consistent with hyperparathyroidism, including multiple kidney stones and hypertension, severe nausea and vomiting. She presented to the ED in February 2023, and was subsequently found to have hypercalcemia upon labs in March 2023.     -CT Renal Stone on 2/21/23:  Negative for obstructive uropathy.  Bilateral nonobstructing renal calculi with a proximal left ureteral calculus.  Asymmetric wall thickening in the stomach is nonspecific and could be due to under distension.  Endoscopy could be performed if clinically indicated.    Anna was referred to Endocrinology and seen by Dr. Monaco in April 2023.    -US Head Soft Tissue on 5/23/23:  Small thyroid gland with no nodules meeting ultrasound criteria for recommendation for FNA  Mildly prominent cervical lymph stone bilaterally all with smooth cortex.  Hypoechoic structure inferior to the left lobe of the thyroid gland 1.3 x 0.6 x 0.8 cm may be lymph node but is thought more suggestive of parathyroid adenoma.    -NM Parathyroid Scan on 5/23/23:  Nodular focus of persistent increased radiotracer accumulation inferior to the left lobe of the thyroid corresponding to an oval-shaped nodule on ultrasound.  The finding is most consistent with a parathyroid adenoma.    Anna underwent subtotal parathyroidectomy on 9/13/23. She is scheduled to follow-up with Dr. Monaco on 10/20/23.    Other tumors?  Anna also reports a history of previous hair loss and very dry skin, but is not experiencing  "that now.    Dr. Monaco ordered other endocrine testing, including:  IGF-1: 109 ng/mL (normal)  Prolactin: 36.7 ng//mL (HIGH)   TSH: 1.912 uIU/mL (normal)  Free T4: 0.82 ng/dL (normal)   Fasting gastrin: 157 pg/mL (HIGH)    She is scheduled to follow-up with Dr. Monaco on 10/20/23, and further assessment for other MEN1-tumors (including carcinoids and endocrine tumors of the GEP tract with other labs and/or imaging) is indicated.    Breast Cancer Risk Assessment Questionnaire  Age:  68 y.o.   Race and ethnicity:  White, Not  or /a  Weight:  142 lb  Height:  4'11"  Mammographic breast density:  Unknown.  Age at menarche:  ?  Age at first live childbirth:  25y  Menopausal status:  postmenopausal  Age at menopause, if applicable:  ?  Hormone replacement therapy use history:  None.  Breast biopsy history and findings:  None.  Thoracic radiation therapy history:  None.  Breast cancer susceptibility gene testing history:  None.  Ashkenazi Confucianist ancestry:  No.  Family history of breast cancer, ovarian cancer, and genetic testing:  See below    Focused Social History  Tobacco Use: Low Risk  (10/18/2023)    Patient History     Smoking Tobacco Use: Never     Smokeless Tobacco Use: Never     Passive Exposure: Not on file      FAMILY HISTORY           Anna reported her family history of cancer as follows:  Mother: diagnosed with breast cancer in her 60s followed by lung cancer in her 80s and  in her 80s; smoker  Maternal uncles x2:  in their 70s and 80s due to cancer, but Anna could not recall the primary sites  Maternal female first cousin: diagnosed with breast cancer in her 50s and  in her 60s  Maternal female first cousin: diagnosed with breast cancer in her 50s and is still living; no known genetic testing    Father: diagnosed with lung cancer and  in his 80s; smoker  Paternal grandmother: diagnosed with breast cancer at unknown age and  in her 80s  Paternal aunt: diagnosed with breast " "cancer and  at unknown ages  Paternal aunt: diagnosed with breast cancer at unknown age and is still living in her 80s; no known genetic testing    A family history of birth defects, intellectual disability, SIDS, sudden early death, multiple miscarriages and consanguinity were denied. Please refer to above pedigree for further details. A larger copy is available for review the Media tab.    DISCUSSION     MEN1    GENETIC TEST RESULTS    Anna had a sample submitted to [facility] by Dr. Monaco on 23 for MEN1 sequencing and deletion/duplication analysis only (no other genes were included).     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 This particular mutation is described as c.792del, which is the deletion of a single nucleotide that results in a translational frameshift and loss of function. This is considered a positive result and is diagnostic of a hereditary cancer syndrome known as Multiple Endocrine Neoplasia type 1 (MEN1).     As it was listed as "mosaic" I contacted a genetic counselor at [facility] on 10/13/23 to learn more about the variant allele frequency (VAF). Laura (the ) informed me that the methodology was used for the testing was Silvia sequencing (not NGS), so a VAF cannot be determined. However, it must have been found at a high enough frequency to be detected on Silvia as it is a much less sensitive technology.     TUMOR RISKS    Caused by heterozygous mutations in the MEN1 gene, multiple endocrine neoplasia type 1 (MEN1) is an autosomal dominant hereditary tumor predisposition syndrome that is characterized by:  Primary hyperparathyroidism (100% affected by 51yo)  Anterior pituitary tumors (~30-40%)  Prolactinomas, other hormone-secreting tumors or nonfunctioning  Well-differentiated tumors of the gastro-entero-pancreatic tract (~50%)  Most commonly gastrinomas, but can be nonfunctioning or others such as insulinoma, glucagonoma, VIPoma or " somatostatinoma  Carcinoid tumors  Thymic, bronchial or type II gastric ECL  Adrenocortical tumors (~40%)  Typically cortisol- or aldosterone-secreting but rarely pheochromocytoma  Other features: facial angiofibromas, collagenomas, lipomas, meningiomas, uterine leiomyomas and thyroid tumors    Smoking in individuals with MEN1 is associated with a higher risk of carcinoid tumors, particularly thymic tumors in male smokers. Prophylactic thymectomy may even be performed in male smokers already undergoing neck surgery for hyperparathyroidism.    While the risk of pheochromocytomas is low in individuals with MEN1, measurement of urine catecholamines is appropriate prior to surgery.    MANAGEMENT RECOMMENDATIONS    Clinical practice guidelines published by Paula et al in 2012 recommends the following surveillance for individuals with MEN1:  Parathyroid disease:   Annual fasting total serum calcium concentration (corrected for albumin) &/or ionized-serum calcium concentration beginning at 6yo  Consider fasting serum concentration of intact (full-length) PTH  Anterior pituitary adenomas:   Annual serum concentration of prolactin, IGF-1, fasting glucose, & insulin beginning at 6yo   Head MRI every 3-5 years beginning at 6yo  GEP tract endocrine tumors:   Annual chromogranin-A, pancreatic polypeptide, glucagon, vasoactive intestinal peptide for other pancreatic neuroendocrine tumors beginning at 9yo  Annual fasting serum gastrin concentration beginning at 19yo  Consider abdominal CT, MRI, or EUS exam every 3-5 years beginning at 19yo   Carcinoid tumors:  Consider annual chest CT, chest MRI or SRS octreotide scan beginning at 14yo  Non-endocrine tumors:  Annual skin exam or as needed    For individuals who are at 50% risk of MEN1 who have not yet undergone predictive testing of the familial mutation, or if genetic testing in the affected family member did not identify a pathogenic mutation, some (but not all) of this  screening is recommended.    FAMILY MEMBERS AND INHERITANCE    We discussed how MEN1 mutations are passed through the family. Anna's children have a 50% chance to have inherited the same MEN1 mutation identified in her. We reviewed that typically MEN1 mutations are inherited from one of our parents. In some cases, however, they start new in an individual. This is called a de kaye mutation. The MEN1 gene has a 10% de kaye rate. Genetic testing for Anna's immediate family (siblings and children) is recommended. If one of Anna's siblings is found to carry the same MEN1 mutation, then we would know that it is not de kaye. Then, relatives on each side of the family could undergo predictive testing to determine which side it was inherited from and which relatives are at-risk.     As there are management and screening recommendations for children with MEN1, genetic testing is reasonable to perform in children (although some may just want to undergo screening and wait to undergo genetic testing until they are adults). If anyone is interested in having their children tested for the familial MEN1 mutation, contact information will be provided to the [facility].      Anna received comprehensive counseling regarding her MEN1+ genetic test results. Benefits and limitations of genetic testing were discussed, and she will provided with a copy of her genetic test results at her follow-up genetic counseling appointment. Anna is encouraged to follow-up with cancer genetics every ~2 years for updates regarding MEN1-management, and in the meantime she can contact us with any changes to her personal or family history, or if she has any questions or concerns.     FAMILY HISTORY OF BREAST CANCER    Approximately 5-10% of breast cancers are due to hereditary causes. The majority of hereditary breast cancers (>50%) are due to mutations in BRCA1 or BRCA2.  Around 12-30% are due to mutations in other highly  penetrant genes, such as PALB2, PTEN and TP53, or in moderately penetrant genes, such as SHANELL, BARD1, and CHEK2.  The remaining percentage are caused by unknown/unidentified genes. Anna meets NCCN criteria for genetic testing separately based on her paternal and maternal family history of 3+ close relatives with breast cancer.  As none of these genes were included in her previous genetic testing, and [facility] does not offer free testing to relatives once a mutation has been identified in a proband, we discussed the possibility of repeating genetic testing with an expanded panel. This expanded testing would assess for hereditary breast cancer conditions while also including the MEN1 gene, such as the Common Hereditary Cancers Panel through [facility] of the following 47 genes:    APC, SHANELL, AXIN2, BARD1, BMPR1A, BRCA1, BRCA2, BRIP1, CDH1, CDK4, CDKN2A, CHEK2, CTNNA1, DICER1, EPCAM, GREM1, HOXB13, KIT, MEN1, MLH1, MSH2, MSH3, MSH6, MUTYH, NBN, NF1, NTHL1, PALB2, PDGFRA, PMS2, POLD1, POLE, PTEN, RAD50, RAD51C, RAD51D, SDHA, SDHB, SDHC, SDHD, SMAD4, SMARCA4, STK11, TP53, TSC1, TSC2, VHL    Anna stated that she is interested in pursuing this testing, but we ran out of time and could not discuss it further. She will follow-up with me in ~2 weeks for another genetic counseling appointment to discuss hereditary cancer genetic testing, variant allele frequency that could be provided on the previously identified MEN1 mutation using a next-generation sequencing method, and testing for relatives.    ASSESSMENT / PLAN      Post-test genetic counseling was provided for Anna after she tested positive for a mosaic pathogenic mutation in MEN1 (c.792del). As Silvia sequencing was used, a VAF could not be given, however, these results likely confirm a diagnosis of MEN1. Anna is being treated by Dr. Monaco in Endocrinology, and as she underwent parathyroidectomy, the following is indicated for her:  Anterior pituitary adenomas:  "  Annual serum concentration of prolactin, IGF-1, fasting glucose, & insulin beginning at 4yo   Head MRI every 3-5 years beginning at 4yo  GEP tract endocrine tumors:   Annual chromogranin-A, pancreatic polypeptide, glucagon, vasoactive intestinal peptide for other pancreatic neuroendocrine tumors beginning at 7yo  Annual fasting serum gastrin concentration beginning at 19yo  Consider abdominal CT, MRI, or EUS exam every 3-5 years beginning at 19yo   Carcinoid tumors:  Consider annual chest CT, chest MRI or SRS octreotide scan beginning at 16yo  Non-endocrine tumors:  Annual skin exam or as needed    Anna will also follow-up with me in ~2 weeks to further discuss hereditary breast cancer genetic testing, given her extensive maternal and paternal family histories of breast cancer. This panel testing can include MEN1, so that NGS technology can be used and a VAF can be obtained to potentially help clarify the previous "mosaic" result. Furthermore, other labs like [facility] and [facility] will provide free familial mutation testing to all of her relatives who are interested.      ICD-10-CM ICD-9-CM   1. MEN 1 (multiple endocrine neoplasia)  E31.21 258.01     1. MEN 1 (multiple endocrine neoplasia)  - Ambulatory referral/consult to Genetics  - Screening as above    2. Family history of breast cancer   Follow-up genetic counseling to discuss panel genetic testing    Follow-up:  Follow up in about 2 weeks (around 11/1/2023) for discussion of additional genetic testing.      Approximately 65 minutes were spent face-to-face with the patient.  Approximately 150 minutes in total were spent on this encounter, which includes face-to-face time and non-face-to-face time preparing to see the patient (e.g., review of tests), obtaining and/or reviewing separately obtained history, documenting clinical information in the electronic or other health record, independently interpreting results (not separately reported) and communicating " results to the patient/family/caregiver, or care coordination (not separately reported).     This assessment is based on the history and reports provided, as well as the current scientific knowledge regarding cancer genetics.         Nargis Mckeon, [facility], [facility]  Senior Genetic Counselor, Hereditary and High-Risk Clinic  Department of Hematology and Oncology  [facility]        CC:  Dr. Reginaldo Monaco

## 2023-10-18 ENCOUNTER — OFFICE VISIT (OUTPATIENT)
Dept: HEMATOLOGY/ONCOLOGY | Facility: CLINIC | Age: 68
End: 2023-10-18
Payer: COMMERCIAL

## 2023-10-18 DIAGNOSIS — Z80.3 FAMILY HISTORY OF BREAST CANCER: Primary | ICD-10-CM

## 2023-10-18 DIAGNOSIS — E31.21 MEN 1 (MULTIPLE ENDOCRINE NEOPLASIA): ICD-10-CM

## 2023-10-18 PROCEDURE — 96040 PR GENETIC COUNSELING, EACH 30 MIN: CPT | Mod: 95,,, | Performed by: GENETIC COUNSELOR, MS

## 2023-10-18 PROCEDURE — 96040 PR GENETIC COUNSELING, EACH 30 MIN: ICD-10-PCS | Mod: 95,,, | Performed by: GENETIC COUNSELOR, MS

## 2023-10-19 ENCOUNTER — PATIENT MESSAGE (OUTPATIENT)
Dept: HEMATOLOGY/ONCOLOGY | Facility: CLINIC | Age: 68
End: 2023-10-19
Payer: COMMERCIAL

## 2023-10-20 ENCOUNTER — OFFICE VISIT (OUTPATIENT)
Dept: ENDOCRINOLOGY | Facility: CLINIC | Age: 68
End: 2023-10-20
Payer: COMMERCIAL

## 2023-10-20 VITALS
WEIGHT: 141.56 LBS | HEART RATE: 61 BPM | BODY MASS INDEX: 27.79 KG/M2 | HEIGHT: 60 IN | DIASTOLIC BLOOD PRESSURE: 70 MMHG | OXYGEN SATURATION: 99 % | SYSTOLIC BLOOD PRESSURE: 122 MMHG

## 2023-10-20 DIAGNOSIS — K21.9 GASTROESOPHAGEAL REFLUX DISEASE, UNSPECIFIED WHETHER ESOPHAGITIS PRESENT: ICD-10-CM

## 2023-10-20 DIAGNOSIS — E21.0 PRIMARY HYPERPARATHYROIDISM: ICD-10-CM

## 2023-10-20 DIAGNOSIS — D35.2 PROLACTINOMA: ICD-10-CM

## 2023-10-20 DIAGNOSIS — E31.21 MEN 1 (MULTIPLE ENDOCRINE NEOPLASIA): Primary | ICD-10-CM

## 2023-10-20 DIAGNOSIS — M81.0 OSTEOPOROSIS, UNSPECIFIED OSTEOPOROSIS TYPE, UNSPECIFIED PATHOLOGICAL FRACTURE PRESENCE: ICD-10-CM

## 2023-10-20 PROCEDURE — 3008F BODY MASS INDEX DOCD: CPT | Mod: CPTII,S$GLB,, | Performed by: INTERNAL MEDICINE

## 2023-10-20 PROCEDURE — 99214 PR OFFICE/OUTPT VISIT, EST, LEVL IV, 30-39 MIN: ICD-10-PCS | Mod: S$GLB,,, | Performed by: INTERNAL MEDICINE

## 2023-10-20 PROCEDURE — 1101F PT FALLS ASSESS-DOCD LE1/YR: CPT | Mod: CPTII,S$GLB,, | Performed by: INTERNAL MEDICINE

## 2023-10-20 PROCEDURE — 3078F DIAST BP <80 MM HG: CPT | Mod: CPTII,S$GLB,, | Performed by: INTERNAL MEDICINE

## 2023-10-20 PROCEDURE — 1159F PR MEDICATION LIST DOCUMENTED IN MEDICAL RECORD: ICD-10-PCS | Mod: CPTII,S$GLB,, | Performed by: INTERNAL MEDICINE

## 2023-10-20 PROCEDURE — 3288F PR FALLS RISK ASSESSMENT DOCUMENTED: ICD-10-PCS | Mod: CPTII,S$GLB,, | Performed by: INTERNAL MEDICINE

## 2023-10-20 PROCEDURE — 4010F ACE/ARB THERAPY RXD/TAKEN: CPT | Mod: CPTII,S$GLB,, | Performed by: INTERNAL MEDICINE

## 2023-10-20 PROCEDURE — 1160F PR REVIEW ALL MEDS BY PRESCRIBER/CLIN PHARMACIST DOCUMENTED: ICD-10-PCS | Mod: CPTII,S$GLB,, | Performed by: INTERNAL MEDICINE

## 2023-10-20 PROCEDURE — 4010F PR ACE/ARB THEARPY RXD/TAKEN: ICD-10-PCS | Mod: CPTII,S$GLB,, | Performed by: INTERNAL MEDICINE

## 2023-10-20 PROCEDURE — 99214 OFFICE O/P EST MOD 30 MIN: CPT | Mod: S$GLB,,, | Performed by: INTERNAL MEDICINE

## 2023-10-20 PROCEDURE — 3008F PR BODY MASS INDEX (BMI) DOCUMENTED: ICD-10-PCS | Mod: CPTII,S$GLB,, | Performed by: INTERNAL MEDICINE

## 2023-10-20 PROCEDURE — 3288F FALL RISK ASSESSMENT DOCD: CPT | Mod: CPTII,S$GLB,, | Performed by: INTERNAL MEDICINE

## 2023-10-20 PROCEDURE — 3074F PR MOST RECENT SYSTOLIC BLOOD PRESSURE < 130 MM HG: ICD-10-PCS | Mod: CPTII,S$GLB,, | Performed by: INTERNAL MEDICINE

## 2023-10-20 PROCEDURE — 3074F SYST BP LT 130 MM HG: CPT | Mod: CPTII,S$GLB,, | Performed by: INTERNAL MEDICINE

## 2023-10-20 PROCEDURE — 99999 PR PBB SHADOW E&M-EST. PATIENT-LVL IV: ICD-10-PCS | Mod: PBBFAC,,, | Performed by: INTERNAL MEDICINE

## 2023-10-20 PROCEDURE — 1126F PR PAIN SEVERITY QUANTIFIED, NO PAIN PRESENT: ICD-10-PCS | Mod: CPTII,S$GLB,, | Performed by: INTERNAL MEDICINE

## 2023-10-20 PROCEDURE — 1159F MED LIST DOCD IN RCRD: CPT | Mod: CPTII,S$GLB,, | Performed by: INTERNAL MEDICINE

## 2023-10-20 PROCEDURE — 1101F PR PT FALLS ASSESS DOC 0-1 FALLS W/OUT INJ PAST YR: ICD-10-PCS | Mod: CPTII,S$GLB,, | Performed by: INTERNAL MEDICINE

## 2023-10-20 PROCEDURE — 1160F RVW MEDS BY RX/DR IN RCRD: CPT | Mod: CPTII,S$GLB,, | Performed by: INTERNAL MEDICINE

## 2023-10-20 PROCEDURE — 1126F AMNT PAIN NOTED NONE PRSNT: CPT | Mod: CPTII,S$GLB,, | Performed by: INTERNAL MEDICINE

## 2023-10-20 PROCEDURE — 99999 PR PBB SHADOW E&M-EST. PATIENT-LVL IV: CPT | Mod: PBBFAC,,, | Performed by: INTERNAL MEDICINE

## 2023-10-20 PROCEDURE — 3078F PR MOST RECENT DIASTOLIC BLOOD PRESSURE < 80 MM HG: ICD-10-PCS | Mod: CPTII,S$GLB,, | Performed by: INTERNAL MEDICINE

## 2023-10-20 RX ORDER — SUCRALFATE 1 G/1
1 TABLET ORAL 4 TIMES DAILY
COMMUNITY
Start: 2023-09-20 | End: 2024-01-15

## 2023-10-20 NOTE — PROGRESS NOTES
CHIEF COMPLAINT:  Hypercalcemia/prolactinoma  68 y.o. old being seen as a follow-up.  Here with daughter    Appears has a history of a prolactinoma.  Told she had it in her 20's. Has been on bromocriptine most of that time.  Saw DR. Bose in the past. Off it for a period of time.  Had an MRI showing a micro adenoma. No galactorrhea. No vision changes. No change in ring/shoe/hat size. No excessive sweating.       Has had off and on mild elevation in Ca since 2016. Kidney stones for approx 25 years.  Status post subtotal parathyroidectomy-see below. 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 Saw  recently- 10/18/23. Last kidney stone early 2023. No Fractures. Taking Vit D, but Not Ca. Has been very active last 2 weeks, but no distinct exercise.     Reviewed Legacy system: Saw Dr. Bose in 2005. Had a DEXA in 2005 showing osteoporosis.     Has not had any more episodes that she attributed to low BS. Has not gotten a glucometer. She has checked BG on a glucometer at work and not low- 80's. NO change in ring/shoe/hat size. No excessive sweating. Occasional nausea. Having some breakthrough GERD.         Some for parathyroidectomy 09/13/2023  Procedures:   Subtotal parathyroidectomy, bilateral exploration, excision of left inferior parathyroid adenoma, right superior and inferior parathyroid glands and half of left superior parathyroid gland.  Intraoperative monitoring and interpretation of bilateral cranial nerves (vagus and recurrent laryngeal nerves) using the [employer] system  Intraoperative PTH level sampling and interpretation      Intraoperative Findings:   Left inferior parathyroid adenoma was identified.  Confirmed parathyroid tissue with frozen section, reported as nodular.  Gland excised.  Inappropriate change in PTH level after left inferior parathyroid adenoma excised.  Left superior parathyroid gland identified, half of gland removed, part sent for biopsy and confirmed parathyroid tissue with frozen section.   Remnant remains in situ and marked with a small clip.  Right superior parathyroid gland identified, initially biopsied, confirmed nodular parathyroid tissue, ultimately gland excised.  Right inferior parathyroid gland identified, initially biopsied, confirmed nodular parathyroid tissue, remaining portion of gland excised but remaining portion of gland was too small for permanent sectioning.  Appropriate decrease in PTH following excision 3.5 glands.  The bilateral recurrent laryngeal nerves and vagus nerves were identified and preserved.  Function was verified using the nerve monitoring system.      PAST MEDICAL HISTORY/PAST SURGICAL HISTORY:  Reviewed in [facility]    SOCIAL HISTORY: Reviewed in [facility]    FAMILY HISTORY:  No known Ca disorders. Had 1st cousin with pituitary disorder- unsure type. No kidney stones. No osteoporosis. No hip fractures. No pancreatic disorders.     MEDICATIONS/ALLERGIES: The patient's MedCard has been updated and reviewed.        PE:    GENERAL: Well developed, well nourished.  NECK: Supple, trachea midline, no palpable thyroid nodules  CHEST: Resp even and unlabored, CTA bilateral.  CARDIAC: RRR, S1, S2 heard, no murmurs, rubs, S3, or S4    LABS/Radiology       Latest Reference Range & Units 09/13/23 08:49 09/13/23 09:44 09/13/23 09:55 09/13/23 10:02 09/13/23 10:06 09/13/23 10:44   PTH Intraoperative 9.0 - 77.0 pg/mL 103.4 (H) 191.0 (H) 97.6 (H) 84.5 (H) 80.6 (H) 90.9 (H)   (H): Data is abnormally high   Latest Reference Range & Units 09/13/23 11:54   PTH Intraoperative 9.0 - 77.0 pg/mL 62.3         ASSESSMENT/PLAN:  1.  Prolactinoma-has been on bromocriptine for many years.  Asymptomatic.  Continue current dose.    2. Primary hyperparathyroidism-status post 3-1/2 gland parathyroidectomy.  Appropriate reduction in postoperative PTH levels.  Calcium level now within normal limits.  Somewhat unusual as usually with MEN1, primary hyperparathyroidism presents at a much earlier age.  At this point  will monitor serum calcium.  Would also like to repeat bone density 1 year after surgery.    3.  GERD-gastric elevated, but may be due to PPI.    4. MEN 1-has seen Genetics.  Currently with parathyroid in pituitary disease.  Has had a normal IGF-1.  We will need to monitor for any recurrence of primary hyperparathyroidism.  We will need to also monitor for potential pancreatic pathology.  Will discuss with Genetics on workup.    5.  Osteoporosis- see #2    FOLLOWUP  F/U 3 months with Renal Panel,

## 2023-11-02 ENCOUNTER — LAB VISIT (OUTPATIENT)
Dept: LAB | Facility: HOSPITAL | Age: 68
End: 2023-11-02
Attending: INTERNAL MEDICINE
Payer: COMMERCIAL

## 2023-11-02 ENCOUNTER — OFFICE VISIT (OUTPATIENT)
Dept: HEMATOLOGY/ONCOLOGY | Facility: CLINIC | Age: 68
End: 2023-11-02
Payer: COMMERCIAL

## 2023-11-02 DIAGNOSIS — Z80.3 FAMILY HISTORY OF BREAST CANCER: ICD-10-CM

## 2023-11-02 DIAGNOSIS — Z80.3 FAMILY HISTORY OF BREAST CANCER: Primary | ICD-10-CM

## 2023-11-02 DIAGNOSIS — E31.21: ICD-10-CM

## 2023-11-02 PROCEDURE — 99999 PR PBB SHADOW E&M-EST. PATIENT-LVL II: ICD-10-PCS | Mod: PBBFAC,,, | Performed by: GENETIC COUNSELOR, MS

## 2023-11-02 PROCEDURE — 96040 PR GENETIC COUNSELING, EACH 30 MIN: ICD-10-PCS | Mod: S$GLB,,, | Performed by: GENETIC COUNSELOR, MS

## 2023-11-02 PROCEDURE — 36415 COLL VENOUS BLD VENIPUNCTURE: CPT | Performed by: INTERNAL MEDICINE

## 2023-11-02 PROCEDURE — 99999 PR PBB SHADOW E&M-EST. PATIENT-LVL II: CPT | Mod: PBBFAC,,, | Performed by: GENETIC COUNSELOR, MS

## 2023-11-02 PROCEDURE — 96040 PR GENETIC COUNSELING, EACH 30 MIN: CPT | Mod: S$GLB,,, | Performed by: GENETIC COUNSELOR, MS

## 2023-11-02 NOTE — PROGRESS NOTES
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 We discussed her management recommendations during her original genetic counseling appointment on 10/18/23, and re-emphasized these given her ED presentation on 10/31/23 with a cyst/lesion found in her pancreas. We also discussed that Anna separately meets NCCN criteria for additional genetic testing given her extensive paternal family history of breast cancer and her maternal family history of breast cancer. As all but one of her affected relatives are , hereditary breast cancer genetic testing is indicated for Anna. As [employer] provides free familial mutation testing to all interested relatives, and NGS technology could clarify the "mosaic" result from her previous report, a sample was submitted on 23 to [employer] for Common Hereditary Cancers Panel +RNA testing, along with informed consent and insurance information. Anna was accompanied to her appointment by her daughter.    FOCUSED PERSONAL HISTORY     Chief Complaint: Genetic Evaluation (MEN1 and family history of breast cancer)    History of Present Illness (HPI):  Anna Alas ("Anna"), 68 y.o., assigned female sex at birth, is returning for further genetic counseling based on her family history of breast cancer.  She initially presented on 10/18/2023 after being referred by Dr. Monaco from Endocrinology given her MEN1+ genetic test results. She has a personal history of " hyperparathyroidism (s/p parathyroidectomy) and a pituitary tumor (prolactinoma). A cyst or other lesions was identified on her pancreas, but it is unclear if she has any other MEN1-related tumors.     Prolactinoma  Anna reported that a pituitary tumor was originally found 42 years after brain imaging due to amenorrhea and galactorrhea that led to a referral to endocrinologist.     -MRI Pituitary on 5/1/23:  There is no acute intracranial abnormality.  There is stable nonspecific white matter change but there is no intracranial hemorrhage, parenchymal mass, acute infarction or abnormal enhancement in the brain.  There are more subtle findings involving the left side of the pituitary gland which remains mildly prominent when compared to the right.  There is a smaller more ill-defined 5 x 5 mm region of T2 hyperintense signal in the left side of the gland which may represent residual but slightly smaller adenoma.  These findings may reflect some degree of treatment response.  Pituitary mass is certainly not any larger when compared to the prior studies.     -MRI Brain on 4/26/19:  Stable size of 6.3 x 7.5 cm left pituitary microadenoma.  Stable chronic microvascular ischemic change.     Primary Hyperparathyroidism  Anna reported a long history of symptoms consistent with hyperparathyroidism, including multiple kidney stones and hypertension, severe nausea and vomiting. She presented to the ED in February 2023, and was subsequently found to have hypercalcemia upon labs in March 2023.      -CT Renal Stone on 2/21/23:  Negative for obstructive uropathy.  Bilateral nonobstructing renal calculi with a proximal left ureteral calculus.  Asymmetric wall thickening in the stomach is nonspecific and could be due to under distension.  Endoscopy could be performed if clinically indicated.     Anna was referred to Endocrinology and seen by Dr. Monaco in April 2023.     -US Head Soft Tissue on 5/23/23:  Small thyroid gland  "with no nodules meeting ultrasound criteria for recommendation for FNA  Mildly prominent cervical lymph stone bilaterally all with smooth cortex.  Hypoechoic structure inferior to the left lobe of the thyroid gland 1.3 x 0.6 x 0.8 cm may be lymph node but is thought more suggestive of parathyroid adenoma.     -NM Parathyroid Scan on 5/23/23:  Nodular focus of persistent increased radiotracer accumulation inferior to the left lobe of the thyroid corresponding to an oval-shaped nodule on ultrasound.  The finding is most consistent with a parathyroid adenoma.     Anna underwent subtotal parathyroidectomy on 9/13/23. She is scheduled to follow-up with Dr. Monaco on 10/20/23.     Other tumors?  Anna also reports a history of previous hair loss and very dry skin, but is not experiencing that now.     Dr. Monaco ordered other endocrine testing, including:  IGF-1: 109 ng/mL (normal)  Prolactin: 36.7 ng//mL (HIGH)   TSH: 1.912 uIU/mL (normal)  Free T4: 0.82 ng/dL (normal)   Fasting gastrin: 157 pg/mL (HIGH)     Anna presented to the ED on 10/31/23 due to nausea, vomiting and diarrhea,  and a pancreatic cyst/lesion was identified upon imaging.    -CT A/P on 10/31/23:  There does appear to be wall thickening of the distal stomach.  Correlate for gastritis.  Small hiatal hernia.  There is 7 mm pancreatic tail cyst.  Differential would include epithelial cyst, pseudocyst, or side branch IPMN.  Bilateral nonobstructing renal calculi.    Dr. Monaco will order other labs and imaging to screening for other MEN1-tumors, especially given this most recent pancreas finding.    Breast Cancer Risk Assessment Questionnaire  Age:  68 y.o.   Race/ethnicity:  White/Not  or /a  Weight:  131 lb  Height:  4'11"  Mammographic breast density:  Unknown  Age at menarche:  15y  Age at first live childbirth:  25y  Menopausal status:  postmenopausal  Age at menopause, if applicable:  53y  Hormone replacement therapy use history:  " None.  Breast biopsy history and findings:  None.  Thoracic radiation therapy history:  None.  Breast cancer susceptibility gene testing history:  None.  Ashkenazi [place] ancestry:  No.  Family history of breast cancer, ovarian cancer, and genetic testing:  See below    Tobacco Use  Tobacco Use: Low Risk  (2023)    Patient History     Smoking Tobacco Use: Never     Smokeless Tobacco Use: Never     Passive Exposure: Not on file     Review of Systems   Patient's Distress Score today was 0/10 (with 10 being the worst).       FAMILY HISTORY         Anna reported her family history of cancer as follows:  Brother: 65yo who was diagnosed with bladder cancer at 55yo    Mother: diagnosed with breast cancer in her 60s and then lung cancer inh er 80s and  in her 80s; smoker  Maternal uncles (x2): diagnosed with cancer (could not recall primary sites) and  in their 70s/80s  Maternal female first cousins (x2): diagnosed with breast cancer in their 50s; one  in her 60s and then other is still living (no known genetic testing)    Father: diagnosed with melanoma on his face at least twice starting at 64yo, then diagnosed with lung cancer and  in his 80s; smoker  Paternal aunt: diagnosed with breast cancer at unknown age and  in her 80s  Paterna grandmother: diagnosed with breast cancer at unknown age and  in her 80s  Paternal great-aunt (grandmother's sister): diagnosed with breast cancer at unknown age and  due to unrelated causes in her 90s  Paternal female first cousins once-removed (x2): diagnosed with breast cancer at unknown ages and are     A family history of birth defects, intellectual disability, SIDS, sudden early death, multiple miscarriages and consanguinity were denied. Please refer to above pedigree for further details. A larger copy is available for review in the Media tab.     DISCUSSION      MEN1    GENETIC TEST RESULTS     Anna had a sample submitted to [facility] by   "Carlos Enrique on 8/4/23 for MEN1 sequencing and deletion/duplication analysis only (no other genes were included).      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 This particular mutation is described as c.792del, which is the deletion of a single nucleotide that results in a translational frameshift and loss of function. This is considered a positive result and is diagnostic of a hereditary cancer syndrome known as Multiple Endocrine Neoplasia type 1 (MEN1).      As it was listed as "mosaic" I contacted a genetic counselor at [facility] on 10/13/23 to learn more about the variant allele frequency (VAF). Laura (the ) informed me that the methodology was used for the testing was Silvia sequencing (not NGS), so a VAF cannot be determined. However, it must have been found at a high enough frequency to be detected on [place] as it is a much less sensitive technology.      TUMOR RISKS     Caused by heterozygous mutations in the MEN1 gene, multiple endocrine neoplasia type 1 (MEN1) is an autosomal dominant hereditary tumor predisposition syndrome that is characterized by:  Primary hyperparathyroidism (100% affected by 49yo)  Anterior pituitary tumors (~30-40%)  Prolactinomas, other hormone-secreting tumors or nonfunctioning  Well-differentiated tumors of the gastro-entero-pancreatic tract (~50%)  Most commonly gastrinomas, but can be nonfunctioning or others such as insulinoma, glucagonoma, VIPoma or somatostatinoma  Carcinoid tumors  Thymic, bronchial or type II gastric ECL  Adrenocortical tumors (~40%)  Typically cortisol- or aldosterone-secreting but rarely pheochromocytoma  Other features: facial angiofibromas, collagenomas, lipomas, meningiomas, uterine leiomyomas and thyroid tumors     Smoking in individuals with MEN1 is associated with a higher risk of carcinoid tumors, particularly thymic tumors in male smokers. Prophylactic thymectomy may even be performed in male smokers already " undergoing neck surgery for hyperparathyroidism.     While the risk of pheochromocytomas is low in individuals with MEN1, measurement of urine catecholamines is appropriate prior to surgery.     MANAGEMENT RECOMMENDATIONS     Clinical practice guidelines published by Paula et al in 2012 recommends the following surveillance for individuals with MEN1:  Parathyroid disease:   Annual fasting total serum calcium concentration (corrected for albumin) &/or ionized-serum calcium concentration beginning at 4yo  Consider fasting serum concentration of intact (full-length) PTH  Anterior pituitary adenomas:   Annual serum concentration of prolactin, IGF-1, fasting glucose, & insulin beginning at 4yo   Head MRI every 3-5 years beginning at 4yo  GEP tract endocrine tumors:   Annual chromogranin-A, pancreatic polypeptide, glucagon, vasoactive intestinal peptide for other pancreatic neuroendocrine tumors beginning at 7yo  Annual fasting serum gastrin concentration beginning at 21yo  Consider abdominal CT, MRI, or EUS exam every 3-5 years beginning at 21yo   Carcinoid tumors:  Consider annual chest CT, chest MRI or SRS octreotide scan beginning at 16yo  Non-endocrine tumors:  Annual skin exam or as needed     For individuals who are at 50% risk of MEN1 who have not yet undergone predictive testing of the familial mutation, or if genetic testing in the affected family member did not identify a pathogenic mutation, some (but not all) of this screening is recommended.     FAMILY MEMBERS AND INHERITANCE     We discussed how MEN1 mutations are passed through the family. Anna's children have a 50% chance to have inherited the same MEN1 mutation identified in her. We reviewed that typically MEN1 mutations are inherited from one of our parents. In some cases, however, they start new in an individual. This is called a de kaye mutation, and the MEN1 gene has a 10% de kaye rate. If her mutation is truly de kaye and/or mosaic, then her  siblings would not be at risk to have inherited this mutation, but her children would still be at risk. Genetic testing for Anna's immediate family (siblings and children) is recommended. If one of Anna's siblings is found to carry the same MEN1 mutation, then we would know that it is not de kaye. Then, relatives on each side of the family could undergo predictive testing to determine which side it was inherited from and which relatives are at-risk.      As there are management and screening recommendations for children with MEN1, genetic testing is reasonable to perform in children (although some may just want to undergo screening and wait to undergo genetic testing until they are adults). If anyone is interested in having their children tested for the familial MEN1 mutation, contact information will be provided to the [facility].       Anna received comprehensive counseling regarding her MEN1+ genetic test results. Benefits and limitations of genetic testing were discussed, and she was provided with a copy of her genetic test results, along with information about mosaicism and the AMEN Support website. Anna is encouraged to follow-up with cancer genetics every ~2 years for updates regarding MEN1-management, and in the meantime she can contact us with any changes to her personal or family history, or if she has any questions or concerns.     HEREDITARY BREAST CANCER GENETIC TESTING    Approximately 5-10% of breast cancers are due to hereditary causes. The majority of hereditary breast cancers (>50%) are due to mutations in BRCA1 or BRCA2.  Around 12-30% are due to mutations in other highly penetrant genes, such as PALB2, PTEN and TP53, or in moderately penetrant genes, such as SHANELL, BARD1, and CHEK2.  The remaining percentage are caused by unknown/unidentified genes. Anna meets NCCN criteria for genetic testing based on the number of close paternal relatives and maternal relatives with  breast cancer . Therefore, she was offered phenotype-driven and broad panel testing. Anna opted for the Common Hereditary Cancers Panel +RNA through [facility] of the following 47 genes associated with hereditary breast, gastrointestinal, gynecologic, kidney, neuroendocrine, pancreatic, prostate, skin and other cancers:    AIP, ALK, APC, SHANELL, AXIN2, BARD1, BMPR1A, BRCA1, BRCA2, BRIP1, CDH1, CDK4, CDKN2A, CHEK2, CTNNA1, DICER1, EPCAM, GREM1, HOXB13, KIT, MEN1, MLH1, MSH2, MSH3, MSH6, MUTYH, NBN, NF1, NTHL1, PALB2, PDGFRA, PMS2, POLD1, POLE, PTEN, RAD50, RAD51C, RAD51D, SDHA, SDHB, SDHC, SDHD, SMAD4, SMARCA4, STK11, TP53, TSC1, TSC2, VHL    We reviewed that mutations in the highly penetrant genes put an individual at a significantly increased risk of breast and other cancers.  There are established screening and surgery guidelines for these syndromes. Mutations in the moderately penetrant genes increase the risk of breast and other cancers, but less is understood regarding their role in cancer risk. There may not be standard screening or management guidelines for individuals who have mutations in these genes. Furthermore, we discussed the psychosocial implications of a positive result, including anxiety, fear and guilt if a mutation is passed on to a child. Anna did not express concern.    We also discussed that it is typically preferred to offer genetic testing to someone in the family who has a history of a suspicious cancer, as their test results can be more informative. If they were to test negative, then we would have ruled out the known forms of hereditary breast cancer. If they were to test positive, then we would know that their cancer history was caused by a hereditary mutation and other individuals in the family could pursue predictive testing. When testing is offered to an individual without a personal history of cancer and they test negative, we do not know if that is because the cancer in the family was  caused by a hereditary mutation and the patient did not inherit it, or if the cancer in the family was secondary to something aside from the aforementioned genes.     In Anna's family, the most appropriate individual to undergo genetic testing would be her paternal grandmother or paternal aunt on her dad's side, and her mother on her maternal side. Unfortunately, they are all . We discussed that Anna could undergo genetic testing, with the limitation that a negative result will not provide any further information regarding her family members' genetic status.     If a genetic mutation is identified in Anna, then her family members could consider undergoing predictive genetic testing to determine if they inherited the familial mutation.  Each first degree relative (parents, siblings, children) has a 50% chance to have the familial mutation.     If no genetic mutation is identified in Anna, then we are still unsure as to what caused her family history of breast cancer.  Other individuals in the family, specifically her siblings, should also consider undergoing genetic testing.  Until the cause of the her cancer family history can be established, Anna may be at an increased risk of developing cancer in her lifetime.  We may never be able to establish the cause of her family history of cancer.     Breast Cancer Risk Stratification   Current, Estimated Breast Cancer Risk Model Used Patient's Score Patient's Risk Category   5-year Kerrie Model 3.2% (vs 2.1%)  [] N/A given age <35   [] Average risk (<1.7%)   [x] Increased risk (?1.7%)   10-year Tyrer-Cuzick v8.0b 6.7% (vs 3.2%)  [] <5%   [x] ?5%    Lifetime (to age 85) Tyrer-Cuzick v8.0b 11.3% (vs 5.5%)  [x] Average risk (<15%)   [] Intermediate risk (?15% - <20%)   [] Increased risk (?20%)     We discussed the differences between these models and how her personal and family history affects these risks. Anna appeared to have a good understanding of her  "risk to develop breast cancer. Due to her >5% 10-year risk to develop breast cancer, she may be eligible for breast MRI for a period of time even if her genetic testing reveals no actionable mutations. However, her breast density will likely have a dramatic impact on this score and should be recalculated once available.    The potential outcomes of testing, including the high VUS (variant of unknown significance) rate seen in panel testing, were reviewed and implications were discussed. There is also a possibility for the patient to incur out-of-pocket costs related to this testing. Issues regarding insurance discrimination were discussed. [employer] protects against employment and health insurance discrimination, but it does not apply to life insurance, long term care or disability policies. It also does not apply to  personnel or employers with less than 15 employees. Anna appeared to have a good understanding of the information as she asked appropriate questions.  A sample was submitted on 11/2/23 to [facility].  Anna's results should be available in approximately 3 weeks.  In the meantime, she is welcome to contact me if she has any questions, concerns, or updates to her family history.     Anna received comprehensive counseling regarding panel testing and has elected to proceed with this testing.     ASSESSMENT / PLAN      Anna Alas ("Anna"), 68 y.o., presented today for hereditary cancer risk assessment and genetic counseling given her mosaic MEN1+ genetic test results (c.792del) that is consistent with her history of hyperparathyroidism and pituitary prolactinoma. As Silvia sequencing was used, a VAF could not be given, however, these results likely confirm a diagnosis of MEN1. Anna is being treated by Dr. Monaco in Endocrinology, and as she underwent parathyroidectomy, the following is indicated for her:  Anterior pituitary adenomas:   Annual serum concentration of prolactin, IGF-1, fasting " "glucose, & insulin beginning at 4yo   Head MRI every 3-5 years beginning at 4yo  GEP tract endocrine tumors:   Annual chromogranin-A, pancreatic polypeptide, glucagon, vasoactive intestinal peptide for other pancreatic neuroendocrine tumors beginning at 7yo  Annual fasting serum gastrin concentration beginning at 21yo  Consider abdominal CT, MRI, or EUS exam every 3-5 years beginning at 21yo   Carcinoid tumors:  Consider annual chest CT, chest MRI or SRS octreotide scan beginning at 16yo  Non-endocrine tumors:  Annual skin exam or as needed     Separately, Anna meets NCCN criteria for additional genetic testing given her extensive maternal and paternal family histories of breast cancer. While a negative result in her would be uninformative, almost all of her affected relatives on both sides of her family are . This panel testing can include MEN1, so that NGS technology can be used and a VAF can be obtained to potentially help clarify the previous "mosaic" result. A sample was submitted today to [facility], which will provide free familial mutation testing to all of her relatives within 150 days of her test report date. I will call Anna once her results are available, and a follow-up genetic counseling appointment will be scheduled if results are positive for a mutation other than the known MEN1 mutation.      ICD-10-CM ICD-9-CM   1. Family history of breast cancer  Z80.3 V16.3   2. Multiple endocrine neoplasia (MEN) type I  E31.21 258.01     1. Family history of breast cancer  - Genetic Misc Sendout Test, Blood; Future    2. Multiple endocrine neoplasia (MEN) type I  - Genetic Misc Sendout Test, Blood; Future       Genetic Test Information  Testing lab: [facility]   Test panel: [facility] Common Hereditary Cancers + RNA Panel (Core:  BRCA1/BRCA2)   ICD-10 code(s): Z80.3, E31.21, Z80.52, Z80.8   Verbal informed consent: Obtained   Written informed consent: Obtained   Specimen type: Blood  (Patient denies blood " disorders that would necessitate a skin fibroblast specimen)   Specimen collection by: [facility]   Specimen collection date: 11/02/2023   Results expected by: Approximately 2-3 weeks after the genetic testing lab receives the specimen   Results disclosure plan: Post-test visit if positive or complex result; otherwise, results will be communicated by phone call       Follow-up:  Follow up if results are positive for a mutation other MEN1.    Questions were encouraged and answered to the patient's satisfaction, and she verbalized understanding of the information and agreement with the plan.           Approximately 50 minutes were spent face-to-face with the patient.  Approximately 120 minutes in total were spent on this encounter, which includes face-to-face time and non-face-to-face time preparing to see the patient (e.g., review of tests), obtaining and/or reviewing separately obtained history, documenting clinical information in the electronic or other health record, independently interpreting results (not separately reported) and communicating results to the patient/family/caregiver, or care coordination (not separately reported).     This assessment is based on the history and reports provided, as well as the current scientific knowledge regarding cancer genetics.       Nargis Mckeon, [facility], [facility]  Senior Genetic Counselor, Hereditary and High-Risk Clinic  Department of Hematology and Oncology  [facility]        CC:  Dr. Reginaldo Monaco

## 2023-11-21 ENCOUNTER — TELEPHONE (OUTPATIENT)
Dept: HEMATOLOGY/ONCOLOGY | Facility: CLINIC | Age: 68
End: 2023-11-21
Payer: COMMERCIAL

## 2023-11-21 DIAGNOSIS — Z80.3 FAMILY HISTORY OF BREAST CANCER: Primary | ICD-10-CM

## 2023-11-21 LAB
GENETIC COUNSELING?: YES
GENSO SPECIMEN TYPE: NORMAL
MISCELLANEOUS GENETIC TEST NAME: NORMAL
PARTENTAL OR SIBLING TESTING?: NO
REFERENCE LAB: NORMAL
TEST RESULT: NORMAL

## 2023-11-21 NOTE — TELEPHONE ENCOUNTER
I left Anna santos  on 11/21/23 to call me back so we can review her updated panel genetic testing. -Nargis Mckeon, [facility], [facility]

## 2023-11-21 NOTE — TELEPHONE ENCOUNTER
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 The VAF was 12-22%, per [facility]. She is scheduled to follow-up with Dr. Monaco on 1/19/24, and will continue MEN1-management with him. She was provided with a Family Letter, so her children and siblings can undergo familial mutation testing through [facility] free testing program within 150 days.    Separately, Anna is still considered at increased at increased risk of breast cancer, given her 10-year risk of breast cancer is >5%. However, this does not include her breast density, as she has not undergone a mammogram in many years. She has been referred to the [facility].    Breast Cancer Risk Stratification   Current, Estimated Breast Cancer Risk Model Used Patient's Score Patient's Risk Category         10-year Tyrer-Cuzick v8.0b 6.8% (vs 3.2%)  [] <5%   [x] ?5%    Lifetime (to age 85) Tyrer-Cuzick v8.0b 11.6% (vs 5.5%)  [x] Average risk (<15%)   [] Intermediate risk (?15% - <20%)   [] Increased risk (?20%)     -Nargis Mckeon, MGC, [facility]

## 2023-11-21 NOTE — LETTER
[facility]  [address] [place] [address]  Phone [phone]  Fax [phone]    Dear relative of Anna Alas,    Anna recently had genetic testing that revealed a mutation in her MEN1 gene, which causes a condition known as Multiple Endocine Neoplasia type 1 (MEN1). Since mutations are passed directly from parent to child, with each child having a 50% chance of inheriting the mutation, this MEN1 mutation could be present in Anna's siblings, children, aunt/uncles, cousins, etc. Genetic counseling and testing is recommended to determine if you or other blood relatives of Anna have this mutation. The genetics provider will determine if you only need testing for this MEN1 mutation, or if you should also be tested for mutations in other genes. If you only need testing for this MEN1 mutation, [facility] will test your MEN1 gene for free if your specimen is received within 150 days of Anna's results date. Please give this letter to your genetics provider.    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 In-person visits are available in [facility], and [place]. Virtual visits are available for individuals located in [place]. Virtual patients must be able to access the virtual visit through the [employer] ([facility]) portal. Anyone who is unable to see an [facility] provider or prefers an in-person visit with someone closer to home can find a genetic counselor in their area by visiting the website for the National Society of  Genetic Counselors (www.NSGC.org) and clicking Find a Genetic Counselor.     Finding out you have a genetic mutation can be difficult, but knowledge is power and increased screening and risk-reduction strategies can prevent cancer or identify cancer sooner when it is more treatable and curable.     Sincerely,    Nargis Mckeon, [facility]  Senior Genetic Counselor  [facility]

## 2023-12-14 ENCOUNTER — OFFICE VISIT (OUTPATIENT)
Dept: HEMATOLOGY/ONCOLOGY | Facility: CLINIC | Age: 68
End: 2023-12-14
Payer: COMMERCIAL

## 2023-12-14 VITALS
SYSTOLIC BLOOD PRESSURE: 123 MMHG | BODY MASS INDEX: 28.31 KG/M2 | WEIGHT: 140.44 LBS | HEART RATE: 82 BPM | TEMPERATURE: 97 F | DIASTOLIC BLOOD PRESSURE: 86 MMHG | OXYGEN SATURATION: 97 % | HEIGHT: 59 IN | RESPIRATION RATE: 18 BRPM

## 2023-12-14 DIAGNOSIS — Z80.3 FAMILY HISTORY OF BREAST CANCER: ICD-10-CM

## 2023-12-14 DIAGNOSIS — E31.21 MEN 1 (MULTIPLE ENDOCRINE NEOPLASIA): Primary | ICD-10-CM

## 2023-12-14 PROCEDURE — 1159F MED LIST DOCD IN RCRD: CPT | Mod: CPTII,S$GLB,, | Performed by: INTERNAL MEDICINE

## 2023-12-14 PROCEDURE — 99999 PR PBB SHADOW E&M-EST. PATIENT-LVL V: ICD-10-PCS | Mod: PBBFAC,,, | Performed by: INTERNAL MEDICINE

## 2023-12-14 PROCEDURE — 3074F PR MOST RECENT SYSTOLIC BLOOD PRESSURE < 130 MM HG: ICD-10-PCS | Mod: CPTII,S$GLB,, | Performed by: INTERNAL MEDICINE

## 2023-12-14 PROCEDURE — 1159F PR MEDICATION LIST DOCUMENTED IN MEDICAL RECORD: ICD-10-PCS | Mod: CPTII,S$GLB,, | Performed by: INTERNAL MEDICINE

## 2023-12-14 PROCEDURE — 3008F BODY MASS INDEX DOCD: CPT | Mod: CPTII,S$GLB,, | Performed by: INTERNAL MEDICINE

## 2023-12-14 PROCEDURE — 3074F SYST BP LT 130 MM HG: CPT | Mod: CPTII,S$GLB,, | Performed by: INTERNAL MEDICINE

## 2023-12-14 PROCEDURE — 3079F PR MOST RECENT DIASTOLIC BLOOD PRESSURE 80-89 MM HG: ICD-10-PCS | Mod: CPTII,S$GLB,, | Performed by: INTERNAL MEDICINE

## 2023-12-14 PROCEDURE — 1126F PR PAIN SEVERITY QUANTIFIED, NO PAIN PRESENT: ICD-10-PCS | Mod: CPTII,S$GLB,, | Performed by: INTERNAL MEDICINE

## 2023-12-14 PROCEDURE — 1126F AMNT PAIN NOTED NONE PRSNT: CPT | Mod: CPTII,S$GLB,, | Performed by: INTERNAL MEDICINE

## 2023-12-14 PROCEDURE — 1101F PR PT FALLS ASSESS DOC 0-1 FALLS W/OUT INJ PAST YR: ICD-10-PCS | Mod: CPTII,S$GLB,, | Performed by: INTERNAL MEDICINE

## 2023-12-14 PROCEDURE — 3008F PR BODY MASS INDEX (BMI) DOCUMENTED: ICD-10-PCS | Mod: CPTII,S$GLB,, | Performed by: INTERNAL MEDICINE

## 2023-12-14 PROCEDURE — 4010F ACE/ARB THERAPY RXD/TAKEN: CPT | Mod: CPTII,S$GLB,, | Performed by: INTERNAL MEDICINE

## 2023-12-14 PROCEDURE — 3288F FALL RISK ASSESSMENT DOCD: CPT | Mod: CPTII,S$GLB,, | Performed by: INTERNAL MEDICINE

## 2023-12-14 PROCEDURE — 4010F PR ACE/ARB THEARPY RXD/TAKEN: ICD-10-PCS | Mod: CPTII,S$GLB,, | Performed by: INTERNAL MEDICINE

## 2023-12-14 PROCEDURE — 3288F PR FALLS RISK ASSESSMENT DOCUMENTED: ICD-10-PCS | Mod: CPTII,S$GLB,, | Performed by: INTERNAL MEDICINE

## 2023-12-14 PROCEDURE — 99214 OFFICE O/P EST MOD 30 MIN: CPT | Mod: S$GLB,,, | Performed by: INTERNAL MEDICINE

## 2023-12-14 PROCEDURE — 99999 PR PBB SHADOW E&M-EST. PATIENT-LVL V: CPT | Mod: PBBFAC,,, | Performed by: INTERNAL MEDICINE

## 2023-12-14 PROCEDURE — 3079F DIAST BP 80-89 MM HG: CPT | Mod: CPTII,S$GLB,, | Performed by: INTERNAL MEDICINE

## 2023-12-14 PROCEDURE — 99214 PR OFFICE/OUTPT VISIT, EST, LEVL IV, 30-39 MIN: ICD-10-PCS | Mod: S$GLB,,, | Performed by: INTERNAL MEDICINE

## 2023-12-14 PROCEDURE — 1101F PT FALLS ASSESS-DOCD LE1/YR: CPT | Mod: CPTII,S$GLB,, | Performed by: INTERNAL MEDICINE

## 2023-12-14 RX ORDER — ONDANSETRON 8 MG/1
TABLET, ORALLY DISINTEGRATING ORAL
COMMUNITY
Start: 2023-11-21 | End: 2024-01-18 | Stop reason: SDUPTHER

## 2023-12-14 RX ORDER — METOCLOPRAMIDE 5 MG/1
TABLET ORAL
COMMUNITY
Start: 2023-11-09 | End: 2024-02-11

## 2023-12-14 NOTE — PROGRESS NOTES
CONSULT NOTE    Subjective:       Patient ID: Anna Alas is a 68 y.o. female.  MRN: [id number]  : 1955    Chief Complaint: Family history of breast cancer      History of Present Illness:   Anna Alas is a 68 y.o. 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   She also has family history of breast cancer and is there to discuss screening recommendations.     Pituitary tumor for 42 years, sees Dr. Monaco.   Parathyroid tumor was removed.     Pancreatic mass found on recent scan. Work up planned.     No recent mammogram.     High Risk Breast cancer specific history:  - Age: 68  - Height:  4 ft 11 inches   - Weight: 140 lbs   - Breast density per BI-RADS: not available.  - Age at menarche:  14  - Number of pregnancies: ; age of first live birth: 25   - History of breast feeding: No.   - Age at menopause, if applicable:  unknown    -Uterus and ovaries intact: Yes  - HRT: No      - Genetic testing: Yes - +  MEN1   - Personal history of cancer: No  - Previous chest radiation exposure between ages 10-30 years old: No  - Personal history of breast biopsy: No  - Ashkenazi Anabaptist Inheritance: No  - Family history of cancer:  extensive paternal family history of breast cancer and her maternal family history of breast cancer. As all but one of her affected relatives are , hereditary breast cancer genetic testing  was done     Anna reported her family history of cancer as follows:  Brother: 63yo who was diagnosed with bladder cancer at 55yo     Mother: diagnosed with breast cancer in her 60s and then lung cancer inh er 80s and  in her 80s; smoker  Maternal uncles (x2): diagnosed with cancer (could not recall primary sites) and  in their 70s/80s  Maternal female first cousins (x2): diagnosed with breast cancer in their 50s; one  in her 60s and then other is still living (no known genetic testing)     Father: diagnosed with melanoma on his face  at least twice starting at 64yo, then diagnosed with lung cancer and  in his 80s; smoker  Paternal aunt: diagnosed with breast cancer at unknown age and  in her 80s  Paterna grandmother: diagnosed with breast cancer at unknown age and  in her 80s  Paternal great-aunt (grandmother's sister): diagnosed with breast cancer at unknown age and  due to unrelated causes in her 90s  Paternal female first cousins once-removed (x2): diagnosed with breast cancer at unknown ages and are     SEE CALCULATED RISK BELOW.     TC score was 11.3%   Does not take into account the MEN 1 status.     Oncology History:  Oncology History    No history exists.       History:  Past Medical History:   Diagnosis Date    Multiple endocrine neoplasia (MEN) type I     Nephrolithiasis     Pituitary tumor     adenoma      Past Surgical History:   Procedure Laterality Date    ANTERIOR VAGINAL REPAIR N/A 2021    Procedure: COLPORRHAPHY, ANTERIOR;  Surgeon: Robb Dooley MD;  Location: [facility];  Service: Urology;  Laterality: N/A;    BREAST SURGERY      right breast abscess    COLONOSCOPY W/ POLYPECTOMY  2023    Dr Patrick    COSMETIC SURGERY      Breast Augmentation    ESOPHAGOGASTRODUODENOSCOPY N/A 2020    Procedure: EGD (ESOPHAGOGASTRODUODENOSCOPY);  Surgeon: Pillo Lopez Jr., MD;  Location: [facility];  Service: Endoscopy;  Laterality: N/A;    EXTRACORPOREAL SHOCK WAVE LITHOTRIPSY      x 2    INSERTION OF MIDURETHRAL SLING N/A 2021    Procedure: SLING, MIDURETHRAL;  Surgeon: Robb Dooley MD;  Location: [facility];  Service: Urology;  Laterality: N/A;    LITHOTRIPSY      twice in , with stent , Once in 3/2015, other procedures at other facility    PARATHYROIDECTOMY N/A 2023    Procedure: PARATHYROIDECTOMY,  SUBTOTAL;  Surgeon: Smitha Brown MD;  Location: [address];  Service: General;  Laterality: N/A;    TUBAL LIGATION      VAGINAL DELIVERY      times 2     Family History    Problem Relation Age of Onset    Breast cancer Mother         dx in 60s    Lung cancer Mother         dx in 80s    Heart disease Mother     Heart attack Mother     Melanoma Father 65        face    Lung cancer Father     Heart disease Father     Heart attack Father         dx in 80s    Heart disease Maternal Grandfather     Breast cancer Paternal Grandmother     Heart disease Paternal Grandfather     No Known Problems Son     Bladder Cancer Brother 54    Cancer Maternal Uncle     Cancer Maternal Uncle     Thyroid disease Paternal Aunt     Breast cancer Paternal Aunt     Breast cancer Maternal Cousin         dx in 50s    Breast cancer Maternal Cousin         dx in 50s    Breast cancer Other     Breast cancer Other     Breast cancer Other     Nephrolithiasis Neg Hx       Social History     Tobacco Use    Smoking status: Never    Smokeless tobacco: Never   Substance and Sexual Activity    Alcohol use: Yes     Alcohol/week: 0.0 standard drinks of alcohol     Comment: ocassionally < monthly    Drug use: No    Sexual activity: Not Currently        ROS:   Review of Systems   Constitutional:  Negative for fever, malaise/fatigue and weight loss.   HENT:  Negative for congestion, ear pain, nosebleeds and sore throat.    Eyes:  Negative for blurred vision, double vision and photophobia.   Respiratory:  Negative for cough, hemoptysis, sputum production, shortness of breath, wheezing and stridor.    Cardiovascular:  Negative for chest pain, palpitations, orthopnea and leg swelling.   Gastrointestinal:  Negative for abdominal pain, blood in stool, constipation, diarrhea, heartburn, melena, nausea and vomiting.   Genitourinary:  Negative for dysuria and hematuria.   Musculoskeletal:  Negative for back pain, myalgias and neck pain.   Skin:  Negative for itching and rash.   Neurological:  Negative for dizziness, focal weakness, seizures, weakness and headaches.   Endo/Heme/Allergies:  Negative for polydipsia. Does not bruise/bleed  "easily.   Psychiatric/Behavioral:  Negative for depression and memory loss. The patient is nervous/anxious. The patient does not have insomnia.         Objective:     Vitals:    12/14/23 1501   BP: 123/86   Pulse: 82   Resp: 18   Temp: 97.1 °F (36.2 °C)   TempSrc: Temporal   SpO2: 97%   Weight: 63.7 kg (140 lb 6.9 oz)   Height: 4' 11" (1.499 m)   PainSc: 0-No pain       Physical Examination:   Physical Exam  Vitals and nursing note reviewed.   Constitutional:       General: She is not in acute distress.     Appearance: She is not diaphoretic.   HENT:      Head: Normocephalic.      Mouth/Throat:      Pharynx: No oropharyngeal exudate.   Eyes:      General: No scleral icterus.     Conjunctiva/sclera: Conjunctivae normal.   Neck:      Thyroid: No thyromegaly.   Cardiovascular:      Rate and Rhythm: Normal rate and regular rhythm.      Heart sounds: Normal heart sounds. No murmur heard.  Pulmonary:      Effort: Pulmonary effort is normal. No respiratory distress.      Breath sounds: No stridor. No wheezing or rales.   Chest:      Chest wall: No tenderness.   Abdominal:      General: Bowel sounds are normal. There is no distension.      Palpations: Abdomen is soft. There is no mass.      Tenderness: There is no abdominal tenderness. There is no rebound.   Musculoskeletal:         General: No tenderness or deformity. Normal range of motion.      Cervical back: Neck supple.   Lymphadenopathy:      Cervical: No cervical adenopathy.   Skin:     General: Skin is warm and dry.      Findings: No erythema or rash.   Neurological:      Mental Status: She is alert and oriented to person, place, and time.      Cranial Nerves: No cranial nerve deficit.      Coordination: Coordination normal.      Gait: Gait is intact.   Psychiatric:         Mood and Affect: Affect normal.         Cognition and Memory: Memory normal.         Judgment: Judgment normal.          Diagnostic Tests:  Significant Imaging: I have reviewed and interpreted " all pertinent imaging results/findings.  CT Chest Without Contrast No results found for this or any previous visit.    CT Chest With ContrastNo results found for this or any previous visit.    CT Abdomen/Pelvis Without Contrast No results found for this or any previous visit.     CT Abdomen/Pelvis With Contrast Results for orders placed during the hospital encounter of 10/31/23    CT Abdomen Pelvis With IV Contrast    Narrative  EXAMINATION:  CT ABDOMEN PELVIS WITH IV CONTRAST    CLINICAL HISTORY:  Nausea, vomiting, diarrhea.    TECHNIQUE:  Axial CT images of the abdomen and pelvis were obtained with intravenous contrast.  Coronal and sagittal reformations were obtained. Automated exposure control was utilized. Total exam DLP is 793 mGy cm.    COMPARISON:  02/21/2023    FINDINGS:  There is motion artifact of breathing.  The lung bases appear clear.  There is small hiatal hernia present.  There is left hepatic lobe cyst.  There is a 7 mm pancreatic tail cyst.  This could reflect epithelial cyst, pseudocyst, or side branch IPMN.  The spleen and adrenal glands appear unremarkable.  The gallbladder appears unremarkable.  There does appear to be wall thickening of the distal stomach could reflect gastritis.  There are bilateral nonobstructing renal calculi present.  Simple left renal cysts are noted, benign findings requiring no follow-up.  Kidneys demonstrate no hydronephrosis or obstructing calculi.  There is no bowel obstruction.  The appendix is normal.  Abdominal aorta is not aneurysmal.  There is no intraperitoneal free air, free fluid, or lymphadenopathy identified.  Urinary bladder appears unremarkable.  No acute displaced fractures identified.    Impression  1. There does appear to be wall thickening of the distal stomach.  Correlate for gastritis.  2. Small hiatal hernia.  3. There is 7 mm pancreatic tail cyst.  Differential would include epithelial cyst, pseudocyst, or side branch IPMN.  4. Bilateral  nonobstructing renal calculi.  5. Additional findings as above.      Electronically signed by: Navi Roberts MD  Date:    10/31/2023  Time:    18:54    CT Abdomen/Pelvis With Without Contrast No results found for this or any previous visit.     PET Scan No results found for this or any previous visit.      Laboratory Data:  All pertinent labs have been reviewed.    Labs:   Lab Results   Component Value Date    WBC 11.49 03/24/2023    HGB 11.9 (L) 03/24/2023    HCT 37.5 03/24/2023    MCV 89 03/24/2023     (H) 03/24/2023       Assessment/Plan:   MEN 1 (multiple endocrine neoplasia)  Continue genetics clinic follow up and surveillance for pancreatic cancer per protocol.   Obtain baseline mammogram and review and then add high risk screening MRI if clinically indicated.   -     Mammo Digital Screening Bilat; Future; Expected date: 12/14/2023    Family history of breast cancer  -     Ambulatory referral/consult to Breast Surgery       ECOG SCORE               Discussion:   No follow-ups on file.    Plan was discussed with the patient at length, and she verbalized understanding. Anna was given an opportunity to ask questions that were answered to her satisfaction, and she was advised to call in the interval if any problems or questions arise.    Electronically signed by Tracy Palencia MD      Route Chart for Scheduling    Med Onc Chart Routing      Follow up with physician 2 months.   Follow up with CAROLANN    Infusion scheduling note    Injection scheduling note    Labs    Imaging Mammogram   asap   Pharmacy appointment    Other referrals

## 2024-01-14 PROBLEM — E31.21 MEN 1 (MULTIPLE ENDOCRINE NEOPLASIA): Status: ACTIVE | Noted: 2024-01-14

## 2024-01-14 PROBLEM — D35.2 PROLACTINOMA: Status: ACTIVE | Noted: 2024-01-14

## 2024-01-18 ENCOUNTER — OFFICE VISIT (OUTPATIENT)
Dept: ENDOCRINOLOGY | Facility: CLINIC | Age: 69
End: 2024-01-18
Payer: COMMERCIAL

## 2024-01-18 DIAGNOSIS — M81.0 OSTEOPOROSIS, UNSPECIFIED OSTEOPOROSIS TYPE, UNSPECIFIED PATHOLOGICAL FRACTURE PRESENCE: ICD-10-CM

## 2024-01-18 DIAGNOSIS — E31.21 MEN 1 (MULTIPLE ENDOCRINE NEOPLASIA): Primary | ICD-10-CM

## 2024-01-18 DIAGNOSIS — D35.2 PROLACTINOMA: ICD-10-CM

## 2024-01-18 DIAGNOSIS — K21.9 GASTROESOPHAGEAL REFLUX DISEASE, UNSPECIFIED WHETHER ESOPHAGITIS PRESENT: ICD-10-CM

## 2024-01-18 DIAGNOSIS — E21.0 PRIMARY HYPERPARATHYROIDISM: ICD-10-CM

## 2024-01-18 PROCEDURE — G2211 COMPLEX E/M VISIT ADD ON: HCPCS | Mod: 95,,, | Performed by: INTERNAL MEDICINE

## 2024-01-18 PROCEDURE — 4010F ACE/ARB THERAPY RXD/TAKEN: CPT | Mod: CPTII,95,, | Performed by: INTERNAL MEDICINE

## 2024-01-18 PROCEDURE — 1160F RVW MEDS BY RX/DR IN RCRD: CPT | Mod: CPTII,95,, | Performed by: INTERNAL MEDICINE

## 2024-01-18 PROCEDURE — 99214 OFFICE O/P EST MOD 30 MIN: CPT | Mod: 95,,, | Performed by: INTERNAL MEDICINE

## 2024-01-18 PROCEDURE — 3044F HG A1C LEVEL LT 7.0%: CPT | Mod: CPTII,95,, | Performed by: INTERNAL MEDICINE

## 2024-01-18 PROCEDURE — 1159F MED LIST DOCD IN RCRD: CPT | Mod: CPTII,95,, | Performed by: INTERNAL MEDICINE

## 2024-01-18 RX ORDER — ONDANSETRON 4 MG/1
4 TABLET, ORALLY DISINTEGRATING ORAL EVERY 12 HOURS PRN
Qty: 60 TABLET | Refills: 3 | Status: SHIPPED | OUTPATIENT
Start: 2024-01-18

## 2024-01-18 NOTE — PROGRESS NOTES
"  CHIEF COMPLAINT:  Hypercalcemia/prolactinoma  68 y.o. old being seen as a follow-up.  Here with daughter    Appears has a history of a prolactinoma.  Told she had it in her 20's. Has been on bromocriptine most of that time.  Saw DR. Bose in the past. Off it for a period of time.  Had an MRI showing a micro adenoma. No galactorrhea. No vision changes. No change in ring/shoe/hat size. No excessive sweating.       Has had off and on mild elevation in Ca since 2016. Kidney stones for approx 25 years.  Status post subtotal parathyroidectomy-see below. 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 Saw  recently- 10/18/23. Last kidney stone early 2023. No Fractures. Taking Vit D, but Not Ca. Has been very active last 2 weeks, but no distinct exercise.     Reviewed [facility] system: Saw Dr. Bose in 2005. Had a DEXA in 2005 showing osteoporosis.     Was in ER Oct 2023 and had CT abd showing cyst in tail of pancreas. States has had some nausea in the past. Has seen GI. On PPI. Does get episodes where she gets "shaky." Will feel the need to eat. Resolves with food. Usually happens after eating carbs. Mainly after pasta. She does have a way of checking but has not checked.           Some for parathyroidectomy 09/13/2023  Procedures:   Subtotal parathyroidectomy, bilateral exploration, excision of left inferior parathyroid adenoma, right superior and inferior parathyroid glands and half of left superior parathyroid gland.  Intraoperative monitoring and interpretation of bilateral cranial nerves (vagus and recurrent laryngeal nerves) using the [employer] system  Intraoperative PTH level sampling and interpretation      Intraoperative Findings:   Left inferior parathyroid adenoma was identified.  Confirmed parathyroid tissue with frozen section, reported as nodular.  Gland excised.  Inappropriate change in PTH level after left inferior parathyroid adenoma excised.  Left superior parathyroid gland identified, half of gland removed, part sent " for biopsy and confirmed parathyroid tissue with frozen section.  Remnant remains in situ and marked with a small clip.  Right superior parathyroid gland identified, initially biopsied, confirmed nodular parathyroid tissue, ultimately gland excised.  Right inferior parathyroid gland identified, initially biopsied, confirmed nodular parathyroid tissue, remaining portion of gland excised but remaining portion of gland was too small for permanent sectioning.  Appropriate decrease in PTH following excision 3.5 glands.  The bilateral recurrent laryngeal nerves and vagus nerves were identified and preserved.  Function was verified using the nerve monitoring system.      PAST MEDICAL HISTORY/PAST SURGICAL HISTORY:  [facility]    SOCIAL HISTORY: Reviewed in [facility]    FAMILY HISTORY:  No known Ca disorders. Had 1st cousin with pituitary disorder- unsure type. No kidney stones. No osteoporosis. No hip fractures. No pancreatic disorders.     MEDICATIONS/ALLERGIES: The patient's MedCard has been updated and reviewed.        PE:    GENERAL: Well developed, well nourished.  NECK: Supple, trachea midline, no palpable thyroid nodules  CHEST: Resp even and unlabored, CTA bilateral.  CARDIAC: RRR, S1, S2 heard, no murmurs, rubs, S3, or S4    LABS/Radiology       Latest Reference Range & Units 09/13/23 08:49 09/13/23 09:44 09/13/23 09:55 09/13/23 10:02 09/13/23 10:06 09/13/23 10:44   PTH Intraoperative 9.0 - 77.0 pg/mL 103.4 (H) 191.0 (H) 97.6 (H) 84.5 (H) 80.6 (H) 90.9 (H)   (H): Data is abnormally high   Latest Reference Range & Units 09/13/23 11:54   PTH Intraoperative 9.0 - 77.0 pg/mL 62.3      Latest Reference Range & Units 01/12/24 10:40   Sodium 136 - 145 mmol/L  136 - 145 mmol/L 141  141   Potassium 3.5 - 5.1 mmol/L  3.5 - 5.1 mmol/L 4.4  4.4   Chloride 95 - 110 mmol/L  95 - 110 mmol/L 106  106   CO2 22 - 31 mmol/L  22 - 31 mmol/L 25  25   Anion Gap 5 - 12 mmol/L  5 - 12 mmol/L 10  10   BUN 7 - 18 mg/dL  7 - 18 mg/dL 20  (H)  20 (H)   Creatinine 0.50 - 1.40 mg/dL  0.50 - 1.40 mg/dL 0.70  0.70   eGFR >60 mL/min/1.73 m^2  >60 mL/min/1.73 m^2 >60  >60   Glucose 70 - 110 mg/dL  70 - 110 mg/dL 82  82   Calcium 8.4 - 10.2 mg/dL  8.4 - 10.2 mg/dL 9.8  9.8   Phosphorus Level 2.7 - 4.5 mg/dL 3.8   ALP 38 - 145 U/L 80   PROTEIN TOTAL 6.0 - 8.4 g/dL 6.7   Albumin 3.5 - 5.2 g/dL  3.5 - 5.2 g/dL 4.2  4.2   BILIRUBIN TOTAL 0.2 - 1.3 mg/dL 0.7   AST 14 - 36 U/L 27   ALT 0 - 35 U/L 20   (H): Data is abnormally high   Latest Reference Range & Units 01/12/24 10:40   TSH 0.400 - 4.000 uIU/mL 1.430   Prolactin 5.2 - 26.5 ng/mL 19.5       ASSESSMENT/PLAN:  1.  Prolactinoma-has been on bromocriptine for many years.  Asymptomatic.  Continue current dose.    2. Primary hyperparathyroidism-status post 3-1/2 gland parathyroidectomy.  Appropriate reduction in postoperative PTH levels.  Calcium level now within normal limits.  Somewhat unusual as usually with MEN1, primary hyperparathyroidism presents at a much earlier age.  At this point will monitor serum calcium.  Would also like to repeat bone density 1 year after surgery.    3.  GERD-gastric elevated, but may be due to PPI.    4. MEN 1-has seen Genetics.  Currently with parathyroid in pituitary disease.  Has had a normal IGF-1.  We will need to monitor for any recurrence of primary hyperparathyroidism.  We will need to also monitor for potential pancreatic pathology.   See below for workup    5.  Osteoporosis- see #2    FOLLOWUP  Chromogranin A,  fasting gastrin, IGF-1 Prolactin, chromogranin a, VIP, pancreatic polypeptide, IGF-1  F/U 3 months with Renal Panel,

## 2024-02-05 PROBLEM — Z00.00 ANNUAL PHYSICAL EXAM: Status: RESOLVED | Noted: 2021-11-02 | Resolved: 2024-02-05

## 2024-02-07 ENCOUNTER — TELEPHONE (OUTPATIENT)
Dept: HEMATOLOGY/ONCOLOGY | Facility: CLINIC | Age: 69
End: 2024-02-07
Payer: COMMERCIAL

## 2024-02-07 NOTE — TELEPHONE ENCOUNTER
Patient seen at outside facility for Dr Nguyen----- Message from Nolan Kenny sent at 2/7/2024  8:53 AM CST -----  Type: Need Medical Advice   Who Called: Patient  Best callback number: [phone]  Additional Information: Patient called stated her appointment verification for her procedure says Amanda Frazier, she was under the impression it was to be at [facility] in [place]  Patient ask that you let her know on the portal she will not have access to her phone until after 3pm  Please call to further assist, Thanks.

## 2024-02-15 ENCOUNTER — TELEPHONE (OUTPATIENT)
Dept: ENDOCRINOLOGY | Facility: CLINIC | Age: 69
End: 2024-02-15
Payer: COMMERCIAL

## 2024-02-15 DIAGNOSIS — E21.0 PRIMARY HYPERPARATHYROIDISM: ICD-10-CM

## 2024-02-15 DIAGNOSIS — K21.9 GASTROESOPHAGEAL REFLUX DISEASE, UNSPECIFIED WHETHER ESOPHAGITIS PRESENT: ICD-10-CM

## 2024-02-15 DIAGNOSIS — E31.21 MEN 1 (MULTIPLE ENDOCRINE NEOPLASIA): Primary | ICD-10-CM

## 2024-02-15 DIAGNOSIS — D35.2 PROLACTINOMA: ICD-10-CM

## 2024-02-15 NOTE — TELEPHONE ENCOUNTER
I saw she was just in ER  Can we have her do a fasting gastrin, chromogranin A, glucagon, VIP, pancreatic polypeptide, CMP, IGF-1, prolactin

## 2024-03-04 ENCOUNTER — PATIENT MESSAGE (OUTPATIENT)
Dept: ENDOCRINOLOGY | Facility: CLINIC | Age: 69
End: 2024-03-04
Payer: COMMERCIAL

## 2024-03-04 ENCOUNTER — TELEPHONE (OUTPATIENT)
Dept: HEMATOLOGY/ONCOLOGY | Facility: CLINIC | Age: 69
End: 2024-03-04
Payer: COMMERCIAL

## 2024-03-04 NOTE — TELEPHONE ENCOUNTER
Spoke with the pt and the pt stated that she will come in at 3:30 pm because she has to be work at 6 pm. Pt verbalized and understanding.  ----- Message from Rupa Huerta, Patient Care Assistant sent at 3/4/2024 10:04 AM CST -----  Regarding: meng pt  Type: Needs Medical Advice  Who Called:  marifer   Best Call Back Number: [phone]    Additional Information: marifer states she needs to be seen by a different provider ( meng teixeira )  please call top further  discuss thank you .

## 2024-03-05 ENCOUNTER — LAB VISIT (OUTPATIENT)
Dept: LAB | Facility: HOSPITAL | Age: 69
End: 2024-03-05
Attending: INTERNAL MEDICINE
Payer: COMMERCIAL

## 2024-03-05 DIAGNOSIS — D35.2 PROLACTINOMA: ICD-10-CM

## 2024-03-05 DIAGNOSIS — E31.21 MEN 1 (MULTIPLE ENDOCRINE NEOPLASIA): ICD-10-CM

## 2024-03-05 DIAGNOSIS — K21.9 GASTROESOPHAGEAL REFLUX DISEASE, UNSPECIFIED WHETHER ESOPHAGITIS PRESENT: ICD-10-CM

## 2024-03-05 DIAGNOSIS — E21.0 PRIMARY HYPERPARATHYROIDISM: ICD-10-CM

## 2024-03-05 LAB
ALBUMIN SERPL BCP-MCNC: 4 G/DL (ref 3.5–5.2)
ALP SERPL-CCNC: 79 U/L (ref 55–135)
ALT SERPL W/O P-5'-P-CCNC: 22 U/L (ref 10–44)
ANION GAP SERPL CALC-SCNC: 11 MMOL/L (ref 8–16)
AST SERPL-CCNC: 26 U/L (ref 10–40)
BILIRUB SERPL-MCNC: 0.9 MG/DL (ref 0.1–1)
BUN SERPL-MCNC: 18 MG/DL (ref 8–23)
CALCIUM SERPL-MCNC: 10 MG/DL (ref 8.7–10.5)
CHLORIDE SERPL-SCNC: 107 MMOL/L (ref 95–110)
CO2 SERPL-SCNC: 23 MMOL/L (ref 23–29)
CREAT SERPL-MCNC: 1 MG/DL (ref 0.5–1.4)
EST. GFR  (NO RACE VARIABLE): >60 ML/MIN/1.73 M^2
GLUCOSE SERPL-MCNC: 87 MG/DL (ref 70–110)
POTASSIUM SERPL-SCNC: 3.8 MMOL/L (ref 3.5–5.1)
PROLACTIN SERPL IA-MCNC: 6.8 NG/ML (ref 5.2–26.5)
PROT SERPL-MCNC: 7.6 G/DL (ref 6–8.4)
SODIUM SERPL-SCNC: 141 MMOL/L (ref 136–145)

## 2024-03-05 PROCEDURE — 80053 COMPREHEN METABOLIC PANEL: CPT | Performed by: INTERNAL MEDICINE

## 2024-03-05 PROCEDURE — 82941 ASSAY OF GASTRIN: CPT | Performed by: INTERNAL MEDICINE

## 2024-03-05 PROCEDURE — 84146 ASSAY OF PROLACTIN: CPT | Performed by: INTERNAL MEDICINE

## 2024-03-05 PROCEDURE — 86316 IMMUNOASSAY TUMOR OTHER: CPT | Performed by: INTERNAL MEDICINE

## 2024-03-05 PROCEDURE — 84586 ASSAY OF VIP: CPT | Performed by: INTERNAL MEDICINE

## 2024-03-05 PROCEDURE — 83519 RIA NONANTIBODY: CPT | Performed by: INTERNAL MEDICINE

## 2024-03-05 PROCEDURE — 84305 ASSAY OF SOMATOMEDIN: CPT | Performed by: INTERNAL MEDICINE

## 2024-03-05 PROCEDURE — 82943 ASSAY OF GLUCAGON: CPT | Performed by: INTERNAL MEDICINE

## 2024-03-05 PROCEDURE — 36415 COLL VENOUS BLD VENIPUNCTURE: CPT | Mod: PN | Performed by: INTERNAL MEDICINE

## 2024-03-07 LAB
CGA SERPL-MCNC: 2508 NG/ML
GASTRIN SERPL-MCNC: 301 PG/ML

## 2024-03-08 LAB — GLUCAGON SERPL-MCNC: 89 PG/ML

## 2024-03-11 LAB
IGF-I SERPL-MCNC: 125 NG/ML (ref 34–194)
IGF-I Z-SCORE SERPL: 0.84 SD

## 2024-03-18 LAB — MAYO MISCELLANEOUS RESULT (REF): NORMAL

## 2024-03-20 LAB — PANC POLYPEPT SERPL-MCNC: 608 PG/ML

## 2024-04-01 ENCOUNTER — HOSPITAL ENCOUNTER (OUTPATIENT)
Dept: CARDIOLOGY | Facility: HOSPITAL | Age: 69
Discharge: HOME | End: 2024-04-01
Attending: INTERNAL MEDICINE
Payer: COMMERCIAL

## 2024-04-01 VITALS — WEIGHT: 138 LBS | HEIGHT: 60 IN | BODY MASS INDEX: 27.09 KG/M2

## 2024-04-01 DIAGNOSIS — E78.1 ABNORMALLY LOW HIGH DENSITY LIPOPROTEIN (HDL) CHOLESTEROL WITH HYPERTRIGLYCERIDEMIA: ICD-10-CM

## 2024-04-01 DIAGNOSIS — E78.6 ABNORMALLY LOW HIGH DENSITY LIPOPROTEIN (HDL) CHOLESTEROL WITH HYPERTRIGLYCERIDEMIA: ICD-10-CM

## 2024-04-01 DIAGNOSIS — I10 MODERATE HYPERTENSION: ICD-10-CM

## 2024-04-01 LAB
ASCENDING AORTA: 3.05 CM
AV INDEX (PROSTH): 0.87
AV MEAN GRADIENT: 3 MMHG
AV PEAK GRADIENT: 6 MMHG
AV VALVE AREA BY VELOCITY RATIO: 2.39 CM²
AV VALVE AREA: 2.74 CM²
AV VELOCITY RATIO: 0.76
BSA FOR ECHO PROCEDURE: 1.63 M2
CV ECHO LV RWT: 0.34 CM
DOP CALC AO PEAK VEL: 1.21 M/S
DOP CALC AO VTI: 25.1 CM
DOP CALC LVOT AREA: 3.1 CM2
DOP CALC LVOT DIAMETER: 2 CM
DOP CALC LVOT PEAK VEL: 0.92 M/S
DOP CALC LVOT STROKE VOLUME: 68.77 CM3
DOP CALCLVOT PEAK VEL VTI: 21.9 CM
E WAVE DECELERATION TIME: 156.85 MSEC
E/A RATIO: 0.99
E/E' RATIO: 11.18 M/S
ECHO LV POSTERIOR WALL: 0.76 CM (ref 0.6–1.1)
EJECTION FRACTION: 65 %
FRACTIONAL SHORTENING: 43 % (ref 28–44)
INTERVENTRICULAR SEPTUM: 0.8 CM (ref 0.6–1.1)
IVRT: 79.92 MSEC
LEFT ATRIUM SIZE: 3.35 CM
LEFT ATRIUM VOLUME INDEX MOD: 26.4 ML/M2
LEFT ATRIUM VOLUME MOD: 42.01 CM3
LEFT INTERNAL DIMENSION IN SYSTOLE: 2.55 CM (ref 2.1–4)
LEFT VENTRICLE DIASTOLIC VOLUME INDEX: 56.3 ML/M2
LEFT VENTRICLE DIASTOLIC VOLUME: 89.52 ML
LEFT VENTRICLE MASS INDEX: 68 G/M2
LEFT VENTRICLE SYSTOLIC VOLUME INDEX: 14.7 ML/M2
LEFT VENTRICLE SYSTOLIC VOLUME: 23.34 ML
LEFT VENTRICULAR INTERNAL DIMENSION IN DIASTOLE: 4.44 CM (ref 3.5–6)
LEFT VENTRICULAR MASS: 107.49 G
LV LATERAL E/E' RATIO: 9.5 M/S
LV SEPTAL E/E' RATIO: 13.57 M/S
LVOT MG: 1.69 MMHG
LVOT MV: 0.61 CM/S
MV PEAK A VEL: 0.96 M/S
MV PEAK E VEL: 0.95 M/S
MV STENOSIS PRESSURE HALF TIME: 45.49 MS
MV VALVE AREA P 1/2 METHOD: 4.84 CM2
PISA TR MAX VEL: 0.96 M/S
PULM VEIN S/D RATIO: 1.21
PV PEAK D VEL: 0.53 M/S
PV PEAK S VEL: 0.64 M/S
RA PRESSURE ESTIMATED: 3 MMHG
RIGHT VENTRICULAR END-DIASTOLIC DIMENSION: 3.59 CM
RIGHT VENTRICULAR LENGTH IN DIASTOLE (APICAL 4-CHAMBER VIEW): 4.64 CM
RV MID DIAMA: 2.19 CM
RV TB RVSP: 4 MMHG
RV TISSUE DOPPLER FREE WALL SYSTOLIC VELOCITY 1 (APICAL 4 CHAMBER VIEW): 13.06 CM/S
SINUS: 3.18 CM
STJ: 2.66 CM
TDI LATERAL: 0.1 M/S
TDI SEPTAL: 0.07 M/S
TDI: 0.09 M/S
TR MAX PG: 4 MMHG
TRICUSPID ANNULAR PLANE SYSTOLIC EXCURSION: 1.77 CM
TV REST PULMONARY ARTERY PRESSURE: 7 MMHG
Z-SCORE OF LEFT VENTRICULAR DIMENSION IN END DIASTOLE: -0.22
Z-SCORE OF LEFT VENTRICULAR DIMENSION IN END SYSTOLE: -0.78

## 2024-04-01 PROCEDURE — 93306 TTE W/DOPPLER COMPLETE: CPT | Mod: 26,,, | Performed by: INTERNAL MEDICINE

## 2024-04-01 PROCEDURE — 93306 TTE W/DOPPLER COMPLETE: CPT | Mod: PO

## 2024-04-24 ENCOUNTER — OFFICE VISIT (OUTPATIENT)
Dept: ENDOCRINOLOGY | Facility: CLINIC | Age: 69
End: 2024-04-24
Payer: COMMERCIAL

## 2024-04-24 DIAGNOSIS — M81.0 OSTEOPOROSIS, UNSPECIFIED OSTEOPOROSIS TYPE, UNSPECIFIED PATHOLOGICAL FRACTURE PRESENCE: ICD-10-CM

## 2024-04-24 DIAGNOSIS — K86.2 PANCREAS CYST: ICD-10-CM

## 2024-04-24 DIAGNOSIS — E21.0 PRIMARY HYPERPARATHYROIDISM: ICD-10-CM

## 2024-04-24 DIAGNOSIS — E31.21 MEN 1 (MULTIPLE ENDOCRINE NEOPLASIA): Primary | ICD-10-CM

## 2024-04-24 DIAGNOSIS — D35.2 PROLACTINOMA: ICD-10-CM

## 2024-04-24 PROCEDURE — 4010F ACE/ARB THERAPY RXD/TAKEN: CPT | Mod: CPTII,95,, | Performed by: INTERNAL MEDICINE

## 2024-04-24 PROCEDURE — 1160F RVW MEDS BY RX/DR IN RCRD: CPT | Mod: CPTII,95,, | Performed by: INTERNAL MEDICINE

## 2024-04-24 PROCEDURE — 3044F HG A1C LEVEL LT 7.0%: CPT | Mod: CPTII,95,, | Performed by: INTERNAL MEDICINE

## 2024-04-24 PROCEDURE — 99214 OFFICE O/P EST MOD 30 MIN: CPT | Mod: 95,,, | Performed by: INTERNAL MEDICINE

## 2024-04-24 PROCEDURE — 1159F MED LIST DOCD IN RCRD: CPT | Mod: CPTII,95,, | Performed by: INTERNAL MEDICINE

## 2024-04-24 NOTE — PROGRESS NOTES
The patient location is: [place]    Visit type: audiovisual    Face to Face time with patient: 15  15 minutes of total time spent on the encounter, which includes face to face time and non-face to face time preparing to see the patient (eg, review of tests), Obtaining and/or reviewing separately obtained history, Documenting clinical information in the electronic or other health record, Independently interpreting results (not separately reported) and communicating results to the patient/family/caregiver, or Care coordination (not separately reported).         Each patient to whom he or she provides medical services by telemedicine is:  (1) informed of the relationship between the physician and patient and the respective role of any other health care provider with respect to management of the patient; and (2) notified that he or she may decline to receive medical services by telemedicine and may withdraw from such care at any time.    Notes:     CHIEF COMPLAINT:  Hypercalcemia/prolactinoma  68 y.o. old being seen as a follow-up.      Appears has a history of a prolactinoma.  Told she had it in her 20's. Has been on bromocriptine most of that time.  Saw DR. Bose in the past. Off it for a period of time.  Had an MRI showing a micro adenoma. No galactorrhea. No vision changes. No change in ring/shoe/hat size. No excessive sweating. No N/V      Has had off and on mild elevation in Ca since 2016. Kidney stones for approx 25 years.  Status post subtotal parathyroidectomy-see below. 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 Saw  recently- 10/18/23. Last kidney stone early 2023. No Fractures. Taking Vitamin D, but not Ca . Has been exercising- walking. Also doing some crossfit.     Reviewed [employer] system: Saw Dr. Bose in 2005. Had a DEXA in 2005 showing osteoporosis.     Was in ER Oct 2023 and had CT abd showing cyst in tail of pancreas. Seeing Dr. Nguyen. Had biopsy 4/17/24.           Some for parathyroidectomy 09/13/2023  Procedures:    Subtotal parathyroidectomy, bilateral exploration, excision of left inferior parathyroid adenoma, right superior and inferior parathyroid glands and half of left superior parathyroid gland.  Intraoperative monitoring and interpretation of bilateral cranial nerves (vagus and recurrent laryngeal nerves) using the [employer] system  Intraoperative PTH level sampling and interpretation      Intraoperative Findings:   Left inferior parathyroid adenoma was identified.  Confirmed parathyroid tissue with frozen section, reported as nodular.  Gland excised.  Inappropriate change in PTH level after left inferior parathyroid adenoma excised.  Left superior parathyroid gland identified, half of gland removed, part sent for biopsy and confirmed parathyroid tissue with frozen section.  Remnant remains in situ and marked with a small clip.  Right superior parathyroid gland identified, initially biopsied, confirmed nodular parathyroid tissue, ultimately gland excised.  Right inferior parathyroid gland identified, initially biopsied, confirmed nodular parathyroid tissue, remaining portion of gland excised but remaining portion of gland was too small for permanent sectioning.  Appropriate decrease in PTH following excision 3.5 glands.  The bilateral recurrent laryngeal nerves and vagus nerves were identified and preserved.  Function was verified using the nerve monitoring system.      PAST MEDICAL HISTORY/PAST SURGICAL HISTORY:  Reviewed in [facility]    SOCIAL HISTORY: Reviewed in [facility]    FAMILY HISTORY:  No known Ca disorders. Had 1st cousin with pituitary disorder- unsure type. No kidney stones. No osteoporosis. No hip fractures. No pancreatic disorders.     MEDICATIONS/ALLERGIES: The patient's MedCard has been updated and reviewed.        PE:    LABS/Radiology     Latest Reference Range & Units 03/05/24 09:58   Sodium 136 - 145 mmol/L 141   Potassium 3.5 - 5.1 mmol/L 4.6   Chloride 95 - 110 mmol/L 105   CO2 23 - 29 mmol/L 26    Anion Gap 8 - 16 mmol/L 10   BUN 8 - 23 mg/dL 21   Creatinine 0.5 - 1.4 mg/dL 1.0   eGFR >60 mL/min/1.73 m^2 >60.0   Glucose 70 - 110 mg/dL 87   Calcium 8.7 - 10.5 mg/dL 9.9   Phosphorus Level 2.7 - 4.5 mg/dL 3.5   Albumin 3.5 - 5.2 g/dL 3.8     \        ASSESSMENT/PLAN:  1.  Prolactinoma-has been on bromocriptine for many years.  Asymptomatic.  Continue current dose.    2. Primary hyperparathyroidism-status post 3-1/2 gland parathyroidectomy.  Appropriate reduction in postoperative PTH levels.  Calcium level now within normal limits.  Somewhat unusual as usually with MEN1, primary hyperparathyroidism presents at a much earlier age.  At this point will monitor serum calcium.  Would also like to repeat bone density 1 year after surgery.    3.  GERD-gastric elevated, but may be due to PPI.    4. 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  Has had a normal IGF-1.  We will need to monitor for any recurrence of primary hyperparathyroidism.  We will need to also monitor for potential pancreatic pathology.   She has had a pancreatic cyst which has been biopsied.     5.  Osteoporosis- see #2    FOLLOWUP  F/U Sept  with Renal Panel, DEXA (Hip, spine, radius)-

## 2024-08-30 ENCOUNTER — PATIENT MESSAGE (OUTPATIENT)
Dept: ENDOCRINOLOGY | Facility: CLINIC | Age: 69
End: 2024-08-30
Payer: COMMERCIAL

## 2024-09-23 ENCOUNTER — PATIENT MESSAGE (OUTPATIENT)
Dept: ENDOCRINOLOGY | Facility: CLINIC | Age: 69
End: 2024-09-23
Payer: COMMERCIAL

## 2024-10-28 ENCOUNTER — OFFICE VISIT (OUTPATIENT)
Dept: ENDOCRINOLOGY | Facility: CLINIC | Age: 69
End: 2024-10-28
Payer: COMMERCIAL

## 2024-10-28 VITALS
WEIGHT: 143.31 LBS | HEIGHT: 59 IN | BODY MASS INDEX: 28.89 KG/M2 | OXYGEN SATURATION: 98 % | SYSTOLIC BLOOD PRESSURE: 110 MMHG | HEART RATE: 71 BPM | DIASTOLIC BLOOD PRESSURE: 62 MMHG

## 2024-10-28 DIAGNOSIS — E31.21 MEN 1 (MULTIPLE ENDOCRINE NEOPLASIA): Primary | ICD-10-CM

## 2024-10-28 DIAGNOSIS — D35.2 PROLACTINOMA: ICD-10-CM

## 2024-10-28 DIAGNOSIS — K21.9 GASTROESOPHAGEAL REFLUX DISEASE, UNSPECIFIED WHETHER ESOPHAGITIS PRESENT: ICD-10-CM

## 2024-10-28 DIAGNOSIS — E21.0 PRIMARY HYPERPARATHYROIDISM: ICD-10-CM

## 2024-10-28 DIAGNOSIS — K86.2 PANCREAS CYST: ICD-10-CM

## 2024-10-28 DIAGNOSIS — M81.0 OSTEOPOROSIS, UNSPECIFIED OSTEOPOROSIS TYPE, UNSPECIFIED PATHOLOGICAL FRACTURE PRESENCE: ICD-10-CM

## 2024-10-28 PROCEDURE — 3008F BODY MASS INDEX DOCD: CPT | Mod: CPTII,S$GLB,, | Performed by: INTERNAL MEDICINE

## 2024-10-28 PROCEDURE — 1101F PT FALLS ASSESS-DOCD LE1/YR: CPT | Mod: CPTII,S$GLB,, | Performed by: INTERNAL MEDICINE

## 2024-10-28 PROCEDURE — 3288F FALL RISK ASSESSMENT DOCD: CPT | Mod: CPTII,S$GLB,, | Performed by: INTERNAL MEDICINE

## 2024-10-28 PROCEDURE — 99214 OFFICE O/P EST MOD 30 MIN: CPT | Mod: S$GLB,,, | Performed by: INTERNAL MEDICINE

## 2024-10-28 PROCEDURE — 3074F SYST BP LT 130 MM HG: CPT | Mod: CPTII,S$GLB,, | Performed by: INTERNAL MEDICINE

## 2024-10-28 PROCEDURE — 1159F MED LIST DOCD IN RCRD: CPT | Mod: CPTII,S$GLB,, | Performed by: INTERNAL MEDICINE

## 2024-10-28 PROCEDURE — 1126F AMNT PAIN NOTED NONE PRSNT: CPT | Mod: CPTII,S$GLB,, | Performed by: INTERNAL MEDICINE

## 2024-10-28 PROCEDURE — G2211 COMPLEX E/M VISIT ADD ON: HCPCS | Mod: S$GLB,,, | Performed by: INTERNAL MEDICINE

## 2024-10-28 PROCEDURE — 1160F RVW MEDS BY RX/DR IN RCRD: CPT | Mod: CPTII,S$GLB,, | Performed by: INTERNAL MEDICINE

## 2024-10-28 PROCEDURE — 4010F ACE/ARB THERAPY RXD/TAKEN: CPT | Mod: CPTII,S$GLB,, | Performed by: INTERNAL MEDICINE

## 2024-10-28 PROCEDURE — 99999 PR PBB SHADOW E&M-EST. PATIENT-LVL IV: CPT | Mod: PBBFAC,,, | Performed by: INTERNAL MEDICINE

## 2024-10-28 PROCEDURE — 3078F DIAST BP <80 MM HG: CPT | Mod: CPTII,S$GLB,, | Performed by: INTERNAL MEDICINE

## 2024-10-28 PROCEDURE — 3044F HG A1C LEVEL LT 7.0%: CPT | Mod: CPTII,S$GLB,, | Performed by: INTERNAL MEDICINE

## 2024-10-28 RX ORDER — CYCLOBENZAPRINE HCL 10 MG
10 TABLET ORAL 3 TIMES DAILY PRN
COMMUNITY
Start: 2024-10-03

## 2025-06-11 DIAGNOSIS — M79.672 LEFT FOOT PAIN: Primary | ICD-10-CM

## 2025-06-16 DIAGNOSIS — M79.672 LEFT FOOT PAIN: Primary | ICD-10-CM

## 2025-06-19 ENCOUNTER — HOSPITAL ENCOUNTER (OUTPATIENT)
Dept: RADIOLOGY | Facility: HOSPITAL | Age: 70
Discharge: HOME | End: 2025-06-19
Attending: ORTHOPAEDIC SURGERY
Payer: MEDICARE

## 2025-06-19 ENCOUNTER — OFFICE VISIT (OUTPATIENT)
Dept: ORTHOPEDICS | Facility: CLINIC | Age: 70
End: 2025-06-19
Payer: MEDICARE

## 2025-06-19 DIAGNOSIS — M84.375A STRESS FRACTURE OF METATARSAL BONE OF LEFT FOOT, INITIAL ENCOUNTER: ICD-10-CM

## 2025-06-19 DIAGNOSIS — M79.672 LEFT FOOT PAIN: ICD-10-CM

## 2025-06-19 DIAGNOSIS — M20.22 ACQUIRED HALLUX RIGIDUS OF LEFT FOOT: Primary | ICD-10-CM

## 2025-06-19 PROCEDURE — 99203 OFFICE O/P NEW LOW 30 MIN: CPT | Mod: S$PBB,,, | Performed by: ORTHOPAEDIC SURGERY

## 2025-06-19 PROCEDURE — 73610 X-RAY EXAM OF ANKLE: CPT | Mod: TC,PO,LT

## 2025-06-19 PROCEDURE — 73630 X-RAY EXAM OF FOOT: CPT | Mod: TC,PO,LT

## 2025-06-19 PROCEDURE — 73630 X-RAY EXAM OF FOOT: CPT | Mod: 26,LT,, | Performed by: RADIOLOGY

## 2025-06-19 PROCEDURE — 73610 X-RAY EXAM OF ANKLE: CPT | Mod: 26,LT,, | Performed by: RADIOLOGY

## 2025-06-19 NOTE — PROGRESS NOTES
Status/Diagnosis: Left hallux rigidus; Hx of Left MT stress fractures.  Date of Surgery: none  Date of Injury: none; DOO 2020  Return visit: PRN  X-rays on Return: pending patient complaint    Chief Complaint: Left foot pain    Present History:  Patient presents today via referral from [facility]   Anna Alas is a 69 y.o. female with longstanding history of left midfoot and forefoot pain.  No known history of injury or other inciting event.  She does report history of metatarsal stress fractures involving both feet that were treated nonoperatively.    Patient has had a bone density scan in the past that was consistent with osteoporosis however she is not currently receiving treatment.    0/10 pain at rest, increased with weight-bearing.    Otherwise healthy.  Denies tobacco use.  Works on her feet as a registered nurse.      Past Medical History:   Diagnosis Date    BRCA1 negative     Pituitary tumor     adenoma       Past Surgical History:   Procedure Laterality Date    ANTERIOR VAGINAL REPAIR N/A 06/04/2021    Procedure: COLPORRHAPHY, ANTERIOR;  Surgeon: Robb Dooley MD;  Location: [facility];  Service: Urology;  Laterality: N/A;    AUGMENTATION OF BREAST Bilateral 2000    BREAST SURGERY      right breast abscess    COLONOSCOPY W/ POLYPECTOMY  07/06/2023    Dr Patrick    COSMETIC SURGERY      Breast Augmentation    ENDOSCOPIC ULTRASOUND OF UPPER GASTROINTESTINAL TRACT Left 04/17/2024    Procedure: ULTRASOUND, UPPER GI TRACT, ENDOSCOPIC;  Surgeon: Castillo Nguyen MD;  Location: [facility];  Service: Endoscopy;  Laterality: Left;    ESOPHAGOGASTRODUODENOSCOPY N/A 09/22/2020    Procedure: EGD (ESOPHAGOGASTRODUODENOSCOPY);  Surgeon: Pillo Lopez Jr., MD;  Location: [facility];  Service: Endoscopy;  Laterality: N/A;    ESOPHAGOGASTRODUODENOSCOPY N/A 02/14/2024    Procedure: EGD (ESOPHAGOGASTRODUODENOSCOPY);  Surgeon: Castillo Nguyen MD;  Location: [facility];  Service: Endoscopy;  Laterality:  N/A;    ESOPHAGOGASTRODUODENOSCOPY N/A 04/17/2024    Procedure: EGD (ESOPHAGOGASTRODUODENOSCOPY);  Surgeon: Castillo Nguyen MD;  Location: [facility];  Service: Endoscopy;  Laterality: N/A;    EXTRACORPOREAL SHOCK WAVE LITHOTRIPSY      x 2    INSERTION OF MIDURETHRAL SLING N/A 06/04/2021    Procedure: SLING, MIDURETHRAL;  Surgeon: Robb Dooley MD;  Location: [facility] OR;  Service: Urology;  Laterality: N/A;    LITHOTRIPSY      twice in 2012, with stent , Once in 3/2015, other procedures at other facility    PARATHYROIDECTOMY N/A 09/13/2023    Procedure: PARATHYROIDECTOMY,  SUBTOTAL;  Surgeon: Smitha Brown MD;  Location: [address];  Service: General;  Laterality: N/A;    TUBAL LIGATION      VAGINAL DELIVERY      times 2       Current Outpatient Medications   Medication Sig    aspirin (ECOTRIN) 81 MG EC tablet Take 81 mg by mouth every other day.    bromocriptine (PARLODEL) 2.5 mg Tab Take 1 tablet (2.5 mg total) by mouth 2 (two) times daily.    cetirizine (ZYRTEC) 10 MG tablet Take 10 mg by mouth daily as needed. 1 Tablet Oral Every day.  as directed    coenzyme Q10 (CO Q-10) 100 mg capsule Take 400 mg by mouth once daily.     cyclobenzaprine (FLEXERIL) 10 MG tablet Take 10 mg by mouth 3 (three) times daily as needed.    dextroamphetamine-amphetamine (ADDERALL) 20 mg tablet Take 1 tablet by mouth 2 (two) times a day.    [START ON 7/11/2025] dextroamphetamine-amphetamine (ADDERALL) 20 mg tablet Take 1 tablet by mouth 2 (two) times a day.    [START ON 8/10/2025] dextroamphetamine-amphetamine (ADDERALL) 20 mg tablet Take 1 tablet by mouth 2 (two) times a day.    diphenoxylate-atropine 2.5-0.025 mg (LOMOTIL) 2.5-0.025 mg per tablet Take 1 tablet by mouth every 6 to 8 hours as needed for Diarrhea.    hydrOXYzine HCL (ATARAX) 25 MG tablet Take 1 tablet (25 mg total) by mouth nightly as needed for Itching.    losartan (COZAAR) 50 MG tablet Take 1 tablet (50 mg total) by mouth once daily.    metoclopramide  HCl (REGLAN) 5 MG tablet Take 1 tablet (5 mg total) by mouth nightly as needed.    omega-3 fatty acids 1,000 mg Cap Take 2 g by mouth once daily.    ondansetron (ZOFRAN-ODT) 8 MG TbDL Take 1 tablet (8 mg total) by mouth every 8 (eight) hours as needed (nausea).    pantoprazole (PROTONIX) 40 MG tablet Take 1 tablet (40 mg total) by mouth once daily.    pravastatin (PRAVACHOL) 40 MG tablet Take 1 tablet (40 mg total) by mouth every evening.    promethazine (PHENERGAN) 25 MG tablet Take 1 tablet (25 mg total) by mouth every 6 (six) hours as needed for Nausea.     No current facility-administered medications for this visit.       Review of patient's allergies indicates:   Allergen Reactions    Fish containing products Hives     Catfish      Lisinopril      Cough    Shellfish containing products Hives     Just Shrimp (food)    Benadryl [diphenhydramine hcl] Other (See Comments)     Other reaction(s): Itching, pt can use up to 25mg without itching    Iodinated contrast media      Known to tolerate with benadryl and pepcid        Family History   Problem Relation Name Age of Onset    Breast cancer Mother N         dx in 60s    Lung cancer Mother N         dx in 80s    Heart disease Mother N     Heart attack Mother N     Cancer Mother N     Melanoma Father N 65        face    Lung cancer Father N     Heart disease Father N     Heart attack Father N         dx in 80s    Cancer Father N     Cancer Maternal Uncle      Cancer Maternal Uncle      Thyroid disease Paternal Aunt Kaitlin     Breast cancer Paternal Aunt Lauryn     Cancer Paternal Uncle N     Heart disease Maternal Grandfather E     Breast cancer Paternal Grandmother N     Cancer Paternal Grandmother N     Heart disease Paternal Grandfather R     Cancer Brother N     Bladder Cancer Brother A 54    Cancer Brother A     No Known Problems Son      Breast cancer Maternal Cousin          dx in 50s    Breast cancer Maternal Cousin          dx in 50s    Breast cancer Other  Gala     Breast cancer Other Patricia/ father's cousin     Breast cancer Other father's cousin     Nephrolithiasis Neg Hx         Social History[1]    Physical exam:  There were no vitals filed for this visit.  There is no height or weight on file to calculate BMI.  General: In no apparent distress; well developed and well nourished.  HEENT: normocephalic; atraumatic.  Cardiovascular: regular rate.  Respiratory: no increased work of breathing.  Musculoskeletal:   Gait: nonantalgic  Inspection:   Patient localizes pain to 2 distinct sites.  There is moderate tenderness over the dorsal 1st MTP joint.  Pain from 20° dorsiflexion to 20° plantar flexion with mild pain at extremes.  Negative grind testing.    Also with diffuse pain along the 2nd through 4th metatarsals centrally.  Negative metatarsal squeeze testing.  No history of radiating pain.  Silfverskiold: Positive  Alignment:  Knee: neutral               Ankle: neutral              Hindfoot: neutral              Forefoot: neutral   Strength:              Dorsiflexion 5/5  Plantar flexion 5/5  Inversion 5/5  Eversion 5/5  Sensation:              SILT distally  ROM:              Ankle: full and painless              Subtalar: full and painless  Pulses: Palpable pedal pulse                   Imaging Studies/Outside documentation:  I have ordered/reviewed/interpreted the following images/outside documentation:  1. Weight-bearing 3-views of Left foot and ankle:   On my independent review, no acute bony abnormality noted.  Moderate 1st MTP DJD.  Calcaneal pitch and talonavicular uncoverage within normal limits.  Ankle mortise remains congruent.        Assessment:  Anna Alas is a 69 y.o. female with Left hallux rigidus; Hx of Left MT stress fractures.     Plan:   Clinical and radiographic findings were discussed.  Recommend conservative management to include rigid soled shoe wear.    Patient provided with a carbon fiber insert handout today.   Also to refrain from  any high impact activities-running, jumping, etc.   We did discuss the potential for 1st MTP corticosteroid injection however patient is minimally symptomatic today.  Should symptoms recur, she will contact my office at which time we will proceed with the above.    Patient voiced understanding all questions were answered.      This note was created using voice recognition software and may contain grammatical errors.         [1]   Social History  Socioeconomic History    Marital status: Single   Tobacco Use    Smoking status: Never    Smokeless tobacco: Never   Substance and Sexual Activity    Alcohol use: Yes     Alcohol/week: 0.0 standard drinks of alcohol     Comment: ocassionally < monthly    Drug use: No    Sexual activity: Not Currently     Birth control/protection: None     Social Drivers of Health     Financial Resource Strain: Low Risk  (10/18/2023)    Overall Financial Resource Strain (CARDIA)     Difficulty of Paying Living Expenses: Not hard at all   Food Insecurity: No Food Insecurity (10/18/2023)    Hunger Vital Sign     Worried About Running Out of Food in the Last Year: Never true     Ran Out of Food in the Last Year: Never true   Transportation Needs: No Transportation Needs (10/18/2023)    PRAPARE - Transportation     Lack of Transportation (Medical): No     Lack of Transportation (Non-Medical): No   Physical Activity: Unknown (1/18/2024)    Exercise Vital Sign     Days of Exercise per Week: Patient declined   Stress: No Stress Concern Present (4/22/2023)    [employer] - Occupational Stress Questionnaire     Feeling of Stress : Not at all   Housing Stability: Unknown (1/18/2024)    Housing Stability Vital Sign     Unable to Pay for Housing in the Last Year: Patient declined     Unstable Housing in the Last Year: No

## (undated) DEVICE — SUT VICRYL 6-0 P1 18IN UD

## (undated) DEVICE — DISCONTINUED HS CLEANUP

## (undated) DEVICE — CHLORAPREP 10.5 ML APPLICATOR

## (undated) DEVICE — DRAPE HALF SURGICAL 40X58IN

## (undated) DEVICE — ELECTRODE BLADE INSULATED 1 IN

## (undated) DEVICE — HEMOSTAT SURGICEL FIBRLR 1X2IN

## (undated) DEVICE — DRESSING TRANS 4X4 TEGADERM

## (undated) DEVICE — GAUZE SPONGE PEANUT STRL

## (undated) DEVICE — TRAY MINOR GEN SURG OMC

## (undated) DEVICE — ELECTRODE REM PLYHSV RETURN 9

## (undated) DEVICE — CONTAINER SPECIMEN OR STER 4OZ

## (undated) DEVICE — DRAPE STERI INSTRUMENT 1018

## (undated) DEVICE — DRAPE CORETEMP FLD WRM 56X62IN

## (undated) DEVICE — SUT LIGACLIP SMALL XTRA

## (undated) DEVICE — PROBE SIMULATOR KRAFF

## (undated) DEVICE — MARKER SKIN STND TIP BLUE BARR

## (undated) DEVICE — SUT 3-0 12-18IN SILK

## (undated) DEVICE — PAD CURAD NONADH 3X4IN

## (undated) DEVICE — DRAPE INSTR MAGNETIC 10X16IN

## (undated) DEVICE — SUT VICRYL 3-0 27 SH

## (undated) DEVICE — DRAPE EENT SPLIT STERILE

## (undated) DEVICE — CORD BIPOLAR 12 FOOT

## (undated) DEVICE — ADHESIVE DERMABOND ADVANCED

## (undated) DEVICE — TUBE EMG NIM 7.0MM TRIVANTAGE

## (undated) DEVICE — NDL HYPO REG 25G X 1 1/2

## (undated) DEVICE — SUT 4-0 12-18IN SILK BLACK

## (undated) DEVICE — NDL SYR 3CC HYPO 25GX5/8 LL